# Patient Record
Sex: MALE | Race: WHITE | HISPANIC OR LATINO | Employment: UNEMPLOYED | URBAN - METROPOLITAN AREA
[De-identification: names, ages, dates, MRNs, and addresses within clinical notes are randomized per-mention and may not be internally consistent; named-entity substitution may affect disease eponyms.]

---

## 2020-01-28 ENCOUNTER — OFFICE VISIT (OUTPATIENT)
Dept: FAMILY MEDICINE CLINIC | Facility: CLINIC | Age: 30
End: 2020-01-28

## 2020-01-28 VITALS
HEART RATE: 77 BPM | DIASTOLIC BLOOD PRESSURE: 92 MMHG | RESPIRATION RATE: 18 BRPM | OXYGEN SATURATION: 100 % | TEMPERATURE: 99.2 F | SYSTOLIC BLOOD PRESSURE: 140 MMHG | BODY MASS INDEX: 29.61 KG/M2 | WEIGHT: 218.6 LBS | HEIGHT: 72 IN

## 2020-01-28 DIAGNOSIS — Z11.4 SCREENING FOR HIV (HUMAN IMMUNODEFICIENCY VIRUS): ICD-10-CM

## 2020-01-28 DIAGNOSIS — B19.20 HEPATITIS C VIRUS INFECTION WITHOUT HEPATIC COMA, UNSPECIFIED CHRONICITY: Primary | ICD-10-CM

## 2020-01-28 DIAGNOSIS — Z02.4 DRIVER'S PERMIT PE (PHYSICAL EXAMINATION): ICD-10-CM

## 2020-01-28 DIAGNOSIS — Z76.89 ENCOUNTER TO ESTABLISH CARE: ICD-10-CM

## 2020-01-28 PROCEDURE — 99203 OFFICE O/P NEW LOW 30 MIN: CPT | Performed by: FAMILY MEDICINE

## 2020-01-28 PROCEDURE — 3008F BODY MASS INDEX DOCD: CPT | Performed by: FAMILY MEDICINE

## 2020-01-28 NOTE — ASSESSMENT & PLAN NOTE
- two months ago patient donated plasma and was told he has hepatitis C during screening  - will check CMP, hepatitis chronic panel, Hepatitis C PCR quant and hepatitis C genotype, screen for HIV   - refer to Gastroenterology for treatment planning   - follow up after GI appointment

## 2020-01-28 NOTE — PROGRESS NOTES
Assessment/Plan     Hepatitis C virus infection without hepatic coma  - two months ago patient donated plasma and was told he has hepatitis C during screening  - will check CMP, hepatitis chronic panel, Hepatitis C PCR quant and hepatitis C genotype, screen for HIV   - refer to Gastroenterology for treatment planning   - follow up after GI appointment     's permit PE (physical examination)  -paper work for [employer] permit completed      Diagnoses and all orders for this visit:    Hepatitis C virus infection without hepatic coma, unspecified chronicity  -     Chronic Hepatitis Panel; Future  -     Hepatitis C RNA, quantitative, PCR; Future  -     Hepatitis C genotype; Future  -     Cancel: Ambulatory referral to Gastroenterology; Future  -     Comprehensive metabolic panel; Future  -     Ambulatory referral to Gastroenterology; Future    Screening for HIV (human immunodeficiency virus)  -     HIV 1/2 AG-AB combo; Future    Encounter to establish care    's permit PE (physical examination)         Subjective     Chief Complaint: Establish care, Hepatitis C     HPI:   This is a 33 yo M who presents to establish care and have 's permit physical  He reports he never had 's license  His previous permit   He donated plasma two months ago and was told he has heapatitis C  He would like treatment  He has been having daily fatigue, for the past month  The following portions of the patient's history were reviewed and updated as appropriate: allergies, current medications, past family history, past medical history, past social history, past surgical history and problem list     Review of Systems  Review of Systems   Constitutional: Positive for fatigue  Negative for chills and fever  HENT: Negative for congestion and sore throat  Respiratory: Negative for cough and shortness of breath  Cardiovascular: Negative for chest pain     Gastrointestinal: Negative for abdominal pain, constipation, diarrhea, nausea and vomiting  Genitourinary: Negative for difficulty urinating  Musculoskeletal: Negative for back pain  Skin: Negative for rash  Neurological: Negative for dizziness and headaches  Objective   Vitals:    01/28/20 1334   BP: 140/92   Pulse: 77   Resp: 18   Temp: 99 2 °F (37 3 °C)   SpO2: 100%       Physical Exam   Constitutional: He is oriented to person, place, and time  He appears well-developed and well-nourished  No distress  HENT:   Mouth/Throat: Oropharynx is clear and moist    Neck: Neck supple  Cardiovascular: Normal rate, regular rhythm and normal heart sounds  No murmur heard  Pulmonary/Chest: Effort normal and breath sounds normal  No respiratory distress  He has no wheezes  Abdominal: Soft  Bowel sounds are normal  There is no tenderness  Musculoskeletal: He exhibits no edema  Neurological: He is alert and oriented to person, place, and time  Skin: He is not diaphoretic     No jaundice appreciated

## 2020-01-31 ENCOUNTER — TRANSCRIBE ORDERS (OUTPATIENT)
Dept: LAB | Facility: CLINIC | Age: 30
End: 2020-01-31

## 2020-01-31 ENCOUNTER — APPOINTMENT (OUTPATIENT)
Dept: LAB | Facility: CLINIC | Age: 30
End: 2020-01-31
Payer: COMMERCIAL

## 2020-01-31 DIAGNOSIS — B19.20 HEPATITIS C VIRUS INFECTION WITHOUT HEPATIC COMA, UNSPECIFIED CHRONICITY: ICD-10-CM

## 2020-01-31 DIAGNOSIS — Z11.4 SCREENING FOR HIV (HUMAN IMMUNODEFICIENCY VIRUS): ICD-10-CM

## 2020-01-31 LAB
ALBUMIN SERPL BCP-MCNC: 4.2 G/DL (ref 3.5–5)
ALP SERPL-CCNC: 56 U/L (ref 46–116)
ALT SERPL W P-5'-P-CCNC: 74 U/L (ref 12–78)
ANION GAP SERPL CALCULATED.3IONS-SCNC: 0 MMOL/L (ref 4–13)
AST SERPL W P-5'-P-CCNC: 23 U/L (ref 5–45)
BILIRUB SERPL-MCNC: 1.03 MG/DL (ref 0.2–1)
BUN SERPL-MCNC: 16 MG/DL (ref 5–25)
CALCIUM SERPL-MCNC: 9.5 MG/DL (ref 8.3–10.1)
CHLORIDE SERPL-SCNC: 102 MMOL/L (ref 100–108)
CO2 SERPL-SCNC: 33 MMOL/L (ref 21–32)
CREAT SERPL-MCNC: 1.28 MG/DL (ref 0.6–1.3)
GFR SERPL CREATININE-BSD FRML MDRD: 75 ML/MIN/1.73SQ M
GLUCOSE P FAST SERPL-MCNC: 92 MG/DL (ref 65–99)
POTASSIUM SERPL-SCNC: 4.1 MMOL/L (ref 3.5–5.3)
PROT SERPL-MCNC: 8 G/DL (ref 6.4–8.2)
SODIUM SERPL-SCNC: 135 MMOL/L (ref 136–145)

## 2020-01-31 PROCEDURE — 80053 COMPREHEN METABOLIC PANEL: CPT

## 2020-01-31 PROCEDURE — 86704 HEP B CORE ANTIBODY TOTAL: CPT

## 2020-01-31 PROCEDURE — 87340 HEPATITIS B SURFACE AG IA: CPT

## 2020-01-31 PROCEDURE — 36415 COLL VENOUS BLD VENIPUNCTURE: CPT

## 2020-01-31 PROCEDURE — 87522 HEPATITIS C REVRS TRNSCRPJ: CPT

## 2020-01-31 PROCEDURE — 86803 HEPATITIS C AB TEST: CPT

## 2020-01-31 PROCEDURE — 87389 HIV-1 AG W/HIV-1&-2 AB AG IA: CPT

## 2020-01-31 PROCEDURE — 86705 HEP B CORE ANTIBODY IGM: CPT

## 2020-01-31 PROCEDURE — 87902 NFCT AGT GNTYP ALYS HEP C: CPT

## 2020-02-01 LAB
HBV CORE AB SER QL: ABNORMAL
HBV CORE IGM SER QL: ABNORMAL
HBV SURFACE AG SER QL: ABNORMAL
HCV AB SER QL: ABNORMAL

## 2020-02-03 LAB
HCV RNA SERPL NAA+PROBE-ACNC: NORMAL IU/ML
HCV RNA SERPL NAA+PROBE-LOG IU: 4.25 LOG10 IU/ML
TEST INFORMATION: NORMAL

## 2020-02-04 LAB
HCV GENTYP SERPL NAA+PROBE: NORMAL
HCV PLEASE NOTE: NORMAL
HIV 1+2 AB+HIV1 P24 AG SERPL QL IA: NORMAL

## 2020-02-11 ENCOUNTER — HOSPITAL ENCOUNTER (EMERGENCY)
Facility: HOSPITAL | Age: 30
Discharge: HOME | End: 2020-02-11
Attending: EMERGENCY MEDICINE | Admitting: EMERGENCY MEDICINE
Payer: COMMERCIAL

## 2020-02-11 VITALS
HEART RATE: 95 BPM | TEMPERATURE: 97.8 F | SYSTOLIC BLOOD PRESSURE: 139 MMHG | RESPIRATION RATE: 18 BRPM | DIASTOLIC BLOOD PRESSURE: 89 MMHG | OXYGEN SATURATION: 99 %

## 2020-02-11 DIAGNOSIS — R45.1 AGITATION: ICD-10-CM

## 2020-02-11 DIAGNOSIS — F13.10 BENZODIAZEPINE ABUSE (HCC): ICD-10-CM

## 2020-02-11 DIAGNOSIS — F10.920 ALCOHOLIC INTOXICATION WITHOUT COMPLICATION (HCC): Primary | ICD-10-CM

## 2020-02-11 LAB
ALBUMIN SERPL BCP-MCNC: 4.8 G/DL (ref 3.5–5)
ALP SERPL-CCNC: 62 U/L (ref 46–116)
ALT SERPL W P-5'-P-CCNC: 76 U/L (ref 12–78)
AMPHETAMINES SERPL QL SCN: NEGATIVE
ANION GAP SERPL CALCULATED.3IONS-SCNC: 8 MMOL/L (ref 4–13)
APAP SERPL-MCNC: <2 UG/ML (ref 10–20)
AST SERPL W P-5'-P-CCNC: 29 U/L (ref 5–45)
BARBITURATES UR QL: NEGATIVE
BASOPHILS # BLD AUTO: 0.04 THOUSANDS/ΜL (ref 0–0.1)
BASOPHILS NFR BLD AUTO: 1 % (ref 0–1)
BENZODIAZ UR QL: POSITIVE
BILIRUB SERPL-MCNC: 0.75 MG/DL (ref 0.2–1)
BILIRUB UR QL STRIP: NEGATIVE
BUN SERPL-MCNC: 12 MG/DL (ref 5–25)
CALCIUM SERPL-MCNC: 9.4 MG/DL (ref 8.3–10.1)
CHLORIDE SERPL-SCNC: 102 MMOL/L (ref 100–108)
CLARITY UR: CLEAR
CO2 SERPL-SCNC: 32 MMOL/L (ref 21–32)
COCAINE UR QL: NEGATIVE
COLOR UR: YELLOW
COLOR, POC: YELLOW
CREAT SERPL-MCNC: 1.09 MG/DL (ref 0.6–1.3)
EOSINOPHIL # BLD AUTO: 0.09 THOUSAND/ΜL (ref 0–0.61)
EOSINOPHIL NFR BLD AUTO: 1 % (ref 0–6)
ERYTHROCYTE [DISTWIDTH] IN BLOOD BY AUTOMATED COUNT: 12.2 % (ref 11.6–15.1)
ETHANOL EXG-MCNC: 0.08 MG/DL
ETHANOL SERPL-MCNC: 155 MG/DL (ref 0–3)
GFR SERPL CREATININE-BSD FRML MDRD: 91 ML/MIN/1.73SQ M
GLUCOSE SERPL-MCNC: 110 MG/DL (ref 65–140)
GLUCOSE UR STRIP-MCNC: NEGATIVE MG/DL
HCT VFR BLD AUTO: 50.7 % (ref 36.5–49.3)
HGB BLD-MCNC: 16.9 G/DL (ref 12–17)
HGB UR QL STRIP.AUTO: NEGATIVE
IMM GRANULOCYTES # BLD AUTO: 0.02 THOUSAND/UL (ref 0–0.2)
IMM GRANULOCYTES NFR BLD AUTO: 0 % (ref 0–2)
KETONES UR STRIP-MCNC: NEGATIVE MG/DL
LEUKOCYTE ESTERASE UR QL STRIP: NEGATIVE
LYMPHOCYTES # BLD AUTO: 1.99 THOUSANDS/ΜL (ref 0.6–4.47)
LYMPHOCYTES NFR BLD AUTO: 29 % (ref 14–44)
MAGNESIUM SERPL-MCNC: 2.2 MG/DL (ref 1.6–2.6)
MCH RBC QN AUTO: 32 PG (ref 26.8–34.3)
MCHC RBC AUTO-ENTMCNC: 33.3 G/DL (ref 31.4–37.4)
MCV RBC AUTO: 96 FL (ref 82–98)
METHADONE UR QL: NEGATIVE
MONOCYTES # BLD AUTO: 0.58 THOUSAND/ΜL (ref 0.17–1.22)
MONOCYTES NFR BLD AUTO: 9 % (ref 4–12)
NEUTROPHILS # BLD AUTO: 4.12 THOUSANDS/ΜL (ref 1.85–7.62)
NEUTS SEG NFR BLD AUTO: 60 % (ref 43–75)
NITRITE UR QL STRIP: NEGATIVE
NRBC BLD AUTO-RTO: 0 /100 WBCS
OPIATES UR QL SCN: NEGATIVE
PCP UR QL: NEGATIVE
PH UR STRIP.AUTO: 5.5 [PH] (ref 4.5–8)
PLATELET # BLD AUTO: 290 THOUSANDS/UL (ref 149–390)
PMV BLD AUTO: 9.7 FL (ref 8.9–12.7)
POTASSIUM SERPL-SCNC: 4.4 MMOL/L (ref 3.5–5.3)
PROT SERPL-MCNC: 8.7 G/DL (ref 6.4–8.2)
PROT UR STRIP-MCNC: NEGATIVE MG/DL
RBC # BLD AUTO: 5.28 MILLION/UL (ref 3.88–5.62)
SALICYLATES SERPL-MCNC: <3 MG/DL (ref 3–20)
SODIUM SERPL-SCNC: 142 MMOL/L (ref 136–145)
SP GR UR STRIP.AUTO: 1.01 (ref 1–1.03)
THC UR QL: NEGATIVE
UROBILINOGEN UR QL STRIP.AUTO: 0.2 E.U./DL
WBC # BLD AUTO: 6.84 THOUSAND/UL (ref 4.31–10.16)

## 2020-02-11 PROCEDURE — 82075 ASSAY OF BREATH ETHANOL: CPT | Performed by: NURSE PRACTITIONER

## 2020-02-11 PROCEDURE — 36415 COLL VENOUS BLD VENIPUNCTURE: CPT | Performed by: NURSE PRACTITIONER

## 2020-02-11 PROCEDURE — 93005 ELECTROCARDIOGRAM TRACING: CPT

## 2020-02-11 PROCEDURE — 80053 COMPREHEN METABOLIC PANEL: CPT | Performed by: NURSE PRACTITIONER

## 2020-02-11 PROCEDURE — 99284 EMERGENCY DEPT VISIT MOD MDM: CPT | Performed by: NURSE PRACTITIONER

## 2020-02-11 PROCEDURE — 83735 ASSAY OF MAGNESIUM: CPT | Performed by: NURSE PRACTITIONER

## 2020-02-11 PROCEDURE — 80320 DRUG SCREEN QUANTALCOHOLS: CPT | Performed by: NURSE PRACTITIONER

## 2020-02-11 PROCEDURE — 99285 EMERGENCY DEPT VISIT HI MDM: CPT

## 2020-02-11 PROCEDURE — 96360 HYDRATION IV INFUSION INIT: CPT

## 2020-02-11 PROCEDURE — 96361 HYDRATE IV INFUSION ADD-ON: CPT

## 2020-02-11 PROCEDURE — 85025 COMPLETE CBC W/AUTO DIFF WBC: CPT | Performed by: NURSE PRACTITIONER

## 2020-02-11 PROCEDURE — 80307 DRUG TEST PRSMV CHEM ANLYZR: CPT | Performed by: NURSE PRACTITIONER

## 2020-02-11 PROCEDURE — 80329 ANALGESICS NON-OPIOID 1 OR 2: CPT | Performed by: NURSE PRACTITIONER

## 2020-02-11 PROCEDURE — 81003 URINALYSIS AUTO W/O SCOPE: CPT

## 2020-02-11 RX ADMIN — SODIUM CHLORIDE 1000 ML: 0.9 INJECTION, SOLUTION INTRAVENOUS at 20:12

## 2020-02-12 LAB
ATRIAL RATE: 86 BPM
P AXIS: 58 DEGREES
PR INTERVAL: 150 MS
QRS AXIS: 70 DEGREES
QRSD INTERVAL: 82 MS
QT INTERVAL: 336 MS
QTC INTERVAL: 402 MS
T WAVE AXIS: 42 DEGREES
VENTRICULAR RATE: 86 BPM

## 2020-02-12 PROCEDURE — 93010 ELECTROCARDIOGRAM REPORT: CPT | Performed by: INTERNAL MEDICINE

## 2020-02-12 NOTE — ED PROVIDER NOTES
History  Chief Complaint   Patient presents with    Altered Mental Status     arrived via EMS, they report pt was at sponsor's house and feel asleep during dinner and was hard to arouse  Per EMS pt was slopw to respond when arrived, put was able to walk to Ambulance  pt denies any complaints on arrival  denies drug/alcohol use today  This is a 34year old male who was brought to the ED via EMS due to being at his sponsor's house and fell asleep during dinner and was difficult to arise  EMS states he was slow to respond when they arrived  Pt was able to walk to ambulance  Pt denies any drug or alcohol use today  RN states that pt was to sleep at shelter tonight  Pt states he does not know why he is in the ED and has no idea how he got here  He states that he has a history of "doing everything man"  Pt states he has no residence  History provided by:  Medical records and patient   used: No    Altered Mental Status   Presenting symptoms: unresponsiveness    Severity:  Unable to specify  Episode history:  Unable to specify  Timing:  Unable to specify  Progression:  Unable to specify  Context: alcohol use and drug use    Associated symptoms: agitation        None       Past Medical History:   Diagnosis Date    Hepatitis C        History reviewed  No pertinent surgical history  Family History   Problem Relation Age of Onset    Hepatitis Mother     HIV Mother     Heart attack Father      I have reviewed and agree with the history as documented      Social History     Tobacco Use    Smoking status: Current Every Day Smoker     Packs/day: 0 25     Years: 15 00     Pack years: 3 75     Types: Cigarettes    Smokeless tobacco: Never Used   Substance Use Topics    Alcohol use: Yes     Frequency: Monthly or less     Drinks per session: 1 or 2     Binge frequency: Never     Comment: "weekends"    Drug use: No       Review of Systems   Unable to perform ROS: Other (pt agitated and uncooperative with assessment )   Psychiatric/Behavioral: Positive for agitation  Physical Exam  Physical Exam   Constitutional: He appears well-developed and well-nourished  Non-toxic appearance  He does not appear ill  No distress  Pt is sleeping on the exam bed and does not respond to verbal stimuli  Tactile stimuli used to get pt to respond  Pt is agitated and verbally abusive  HENT:   Head: Normocephalic and atraumatic  Mouth/Throat: Oropharynx is clear and moist    Eyes: EOM are normal  Right eye exhibits no nystagmus  Left eye exhibits no nystagmus  B/L pupils 4 mm and sluggish   Eyes are glassy appearing    Neck: Normal range of motion  Neck supple  Cardiovascular: Normal rate, regular rhythm and normal heart sounds  Pulmonary/Chest: Effort normal and breath sounds normal    Abdominal: Soft  Bowel sounds are normal    Musculoskeletal: Normal range of motion  Neurological: He is alert  He has normal strength  He displays normal reflexes  No cranial nerve deficit or sensory deficit  He displays a negative Romberg sign  Coordination and gait normal  GCS eye subscore is 4  GCS verbal subscore is 5  GCS motor subscore is 6  Skin: Skin is warm and dry  Capillary refill takes less than 2 seconds  Psychiatric: He is agitated  Passive aggressive, verbally abusive  Swearing consistently through interview/assessment/exam     Nursing note and vitals reviewed        Vital Signs  ED Triage Vitals   Temperature Pulse Respirations Blood Pressure SpO2   02/11/20 1857 02/11/20 1846 02/11/20 1846 02/11/20 1846 02/11/20 1846   97 8 °F (36 6 °C) 99 16 141/81 99 %      Temp Source Heart Rate Source Patient Position - Orthostatic VS BP Location FiO2 (%)   02/11/20 1857 02/11/20 1846 02/11/20 2221 02/11/20 2221 --   Oral Monitor Sitting Right arm       Pain Score       02/11/20 1846       No Pain           Vitals:    02/11/20 1846 02/11/20 2221   BP: 141/81 139/89   Pulse: 99 95   Patient Position - Orthostatic VS:  Sitting         Visual Acuity      ED Medications  Medications   sodium chloride 0 9 % bolus 1,000 mL (0 mL Intravenous Stopped 2/11/20 2252)       Diagnostic Studies  Results Reviewed     Procedure Component Value Units Date/Time    POCT alcohol breath test [[id number]]  (Normal) Resulted:  02/11/20 2250    Lab Status:  Final result Updated:  02/11/20 2250     EXTBreath Alcohol 6 028    Salicylate level [[id number]]  (Abnormal) Collected:  02/11/20 2001    Lab Status:  Final result Specimen:  Blood from Arm, Left Updated:  68/12/14 0662     Salicylate Lvl <3 mg/dL     Acetaminophen level-If concentration is detectable, please discuss with medical  on call  [[id number]]  (Abnormal) Collected:  02/11/20 2001    Lab Status:  Final result Specimen:  Blood from Arm, Left Updated:  02/11/20 2056     Acetaminophen Level <2 ug/mL     Ethanol [[id number]]  (Abnormal) Collected:  02/11/20 2001    Lab Status:  Final result Specimen:  Blood from Arm, Left Updated:  02/11/20 2045     Ethanol Lvl 155 mg/dL     Rapid drug screen, urine [[id number]]  (Abnormal) Collected:  02/11/20 2010    Lab Status:  Final result Specimen:  Urine, Other Updated:  02/11/20 2037     Amph/Meth UR Negative     Barbiturate Ur Negative     Benzodiazepine Urine Positive     Cocaine Urine Negative     Methadone Urine Negative     Opiate Urine Negative     PCP Ur Negative     THC Urine Negative    Narrative:       Presumptive report  If requested, specimen will be sent to reference lab for confirmation  FOR MEDICAL PURPOSES ONLY  IF CONFIRMATION NEEDED PLEASE CONTACT THE LAB WITHIN 5 DAYS      Drug Screen Cutoff Levels:  AMPHETAMINE/METHAMPHETAMINES  1000 ng/mL  BARBITURATES     200 ng/mL  BENZODIAZEPINES     200 ng/mL  COCAINE      300 ng/mL  METHADONE      300 ng/mL  OPIATES      300 ng/mL  PHENCYCLIDINE     25 ng/mL  THC       50 ng/mL      Comprehensive metabolic panel [[id number]]  (Abnormal) Collected:  02/11/20 2001    Lab Status:  Final result Specimen:  Blood from Arm, Left Updated:  02/11/20 2034     Sodium 142 mmol/L      Potassium 4 4 mmol/L      Chloride 102 mmol/L      CO2 32 mmol/L      ANION GAP 8 mmol/L      BUN 12 mg/dL      Creatinine 1 09 mg/dL      Glucose 110 mg/dL      Calcium 9 4 mg/dL      AST 29 U/L      ALT 76 U/L      Alkaline Phosphatase 62 U/L      Total Protein 8 7 g/dL      Albumin 4 8 g/dL      Total Bilirubin 0 75 mg/dL      eGFR 91 ml/min/1 73sq m     Narrative:       [place] guidelines for Chronic Kidney Disease (CKD):     Stage 1 with normal or high GFR (GFR > 90 mL/min/1 73 square meters)    Stage 2 Mild CKD (GFR = 60-89 mL/min/1 73 square meters)    Stage 3A Moderate CKD (GFR = 45-59 mL/min/1 73 square meters)    Stage 3B Moderate CKD (GFR = 30-44 mL/min/1 73 square meters)    Stage 4 Severe CKD (GFR = 15-29 mL/min/1 73 square meters)    Stage 5 End Stage CKD (GFR <15 mL/min/1 73 square meters)  Note: GFR calculation is accurate only with a steady state creatinine    Magnesium [[id number]]  (Normal) Collected:  02/11/20 2001    Lab Status:  Final result Specimen:  Blood from Arm, Left Updated:  02/11/20 2034     Magnesium 2 2 mg/dL     POCT urinalysis dipstick [[id number]]  (Normal) Resulted:  02/11/20 2008    Lab Status:  Final result Specimen:  Urine Updated:  02/11/20 2008     Color, UA yellow    Urine Macroscopic, POC [[id number]] Collected:  02/11/20 2007    Lab Status:  Final result Specimen:  Urine Updated:  02/11/20 2008     Color, UA Yellow     Clarity, UA Clear     pH, UA 5 5     Leukocytes, UA Negative     Nitrite, UA Negative     Protein, UA Negative mg/dl      Glucose, UA Negative mg/dl      Ketones, UA Negative mg/dl      Urobilinogen, UA 0 2 E U /dl      Bilirubin, UA Negative     Blood, UA Negative     Specific Gravity, UA 1 010    Narrative:       CLINITEK RESULT    CBC and differential [[id number]]  (Abnormal) Collected:  02/11/20 2001    Lab Status:  Final result Specimen:  Blood from Arm, Left Updated:  02/11/20 2007     WBC 6 84 Thousand/uL      RBC 5 28 Million/uL      Hemoglobin 16 9 g/dL      Hematocrit 50 7 %      MCV 96 fL      MCH 32 0 pg      MCHC 33 3 g/dL      RDW 12 2 %      MPV 9 7 fL      Platelets 187 Thousands/uL      nRBC 0 /100 WBCs      Neutrophils Relative 60 %      Immat GRANS % 0 %      Lymphocytes Relative 29 %      Monocytes Relative 9 %      Eosinophils Relative 1 %      Basophils Relative 1 %      Neutrophils Absolute 4 12 Thousands/µL      Immature Grans Absolute 0 02 Thousand/uL      Lymphocytes Absolute 1 99 Thousands/µL      Monocytes Absolute 0 58 Thousand/µL      Eosinophils Absolute 0 09 Thousand/µL      Basophils Absolute 0 04 Thousands/µL                  No orders to display              Procedures  ECG 12 Lead Documentation Only  Date/Time: 2/11/2020 8:02 PM  Performed by: NINA Barcenas  Authorized by: NINA Barcenas     Indications / Diagnosis:  Unresponsive episode   ECG reviewed by me, the ED Provider: yes (Dr Gracie Hoffmann )    Patient location:  ED  Previous ECG:     Previous ECG:  Unavailable    Comparison to cardiac monitor: Yes    Interpretation:     Interpretation: normal    Rate:     ECG rate:  86    ECG rate assessment: normal    Rhythm:     Rhythm: sinus rhythm    Ectopy:     Ectopy: none    QRS:     QRS axis:  Normal    QRS intervals:  Normal  Conduction:     Conduction: normal    ST segments:     ST segments:  Normal  T waves:     T waves: normal               ED Course  ED Course as of Feb 11 2336   Tue Feb 11, 2020 2048 Pt is heard speaking on the phone  He is awake and talking in sentences and quite alert and awake  ETOH 155   + benzo's  All labs reviewed and discussed with pt         2101 Sponsor is currently here with pt and is refusing to take him home  Pt states he will have to go to a shelter for the night    I have informed pt that he is not able to be discharged at this time due to elevated ETOH level and will recheck at 11pm and if legal limit he may be d/c  Pt is angry and argumentative regarding this action but I have explained in great detail the legalities of this  Pt admits to ETOH and benzo's  2110 Pt was offered HOST and he refused  He denies SI         2111 Pt symptoms compatible with ETOH and benzo use  Pt is awake and neuro intact  I do not feel pt warrants CT head at this time  2215 Pt is sitting on the edge of the bed  eating and in no distress  2250 POCT ETOH  078 pt stable for discharge  Pt is A&O x 3, he has been on cell phone several times through evening  He is ambulatory and steady on his feet  2253 Pt walked out of ED after IV being removed  He left w/o receiving dc instructions             /89 (BP Location: Right arm)   Pulse 95   Temp 97 8 °F (36 6 °C) (Oral)   Resp 18   SpO2 99%                             MDM  Number of Diagnoses or Management Options  Diagnosis management comments: Unresponsive episode (as per EMS)  Hx of Drug and Alcohol abuse  Homelessness    Plan  Labs  IV  IVF  ua  uds  EKG  Tele  May need CT head   Reassess        Amount and/or Complexity of Data Reviewed  Clinical lab tests: ordered and reviewed  Review and summarize past medical records: yes          Disposition  Final diagnoses:   Alcoholic intoxication without complication ([facility] )   Benzodiazepine abuse ([facility] )   Agitation     Time reflects when diagnosis was documented in both MDM as applicable and the Disposition within this note     Time User Action Codes Description Comment    2/11/2020 10:51 PM [employer] Add [E48 418] Alcoholic intoxication without complication ([facility] )     1/73/0054 10:52 PM [employer] Add [F13 10] Benzodiazepine abuse ([address] )     2/11/2020 10:52 PM [employer] Add [R45 1] Agitation       ED Disposition     ED Disposition Condition Date/Time Comment    Discharge Stable Tue Feb 11, 2020 10:53 PM Jorge L Petties III discharge to home/self care   Pt walked out of ED after IV removed  He did not wait for discharge instructions  Follow-up Information    None         There are no discharge medications for this patient  No discharge procedures on file      PDMP Review     None          ED Provider  Electronically Signed by           Kae Cruz  02/11/20 8687

## 2020-03-23 ENCOUNTER — TELEPHONE (OUTPATIENT)
Dept: GASTROENTEROLOGY | Facility: AMBULARY SURGERY CENTER | Age: 30
End: 2020-03-23

## 2020-03-24 ENCOUNTER — HOSPITAL ENCOUNTER (EMERGENCY)
Facility: HOSPITAL | Age: 30
Discharge: HOME | End: 2020-03-24
Attending: EMERGENCY MEDICINE | Admitting: EMERGENCY MEDICINE
Payer: COMMERCIAL

## 2020-03-24 VITALS
SYSTOLIC BLOOD PRESSURE: 134 MMHG | HEART RATE: 81 BPM | DIASTOLIC BLOOD PRESSURE: 76 MMHG | BODY MASS INDEX: 29.16 KG/M2 | TEMPERATURE: 99.3 F | RESPIRATION RATE: 16 BRPM | WEIGHT: 215 LBS | OXYGEN SATURATION: 96 %

## 2020-03-24 DIAGNOSIS — T40.1X1A HEROIN OVERDOSE (HCC): Primary | ICD-10-CM

## 2020-03-24 PROCEDURE — 99284 EMERGENCY DEPT VISIT MOD MDM: CPT

## 2020-03-24 PROCEDURE — 99282 EMERGENCY DEPT VISIT SF MDM: CPT | Performed by: PHYSICIAN ASSISTANT

## 2020-03-25 NOTE — ED NOTES
Pt drinking water, tolerating well  Pt denies any complaints at this time       Vanessa Ingram, HERMINIO  03/24/20 8677

## 2020-03-25 NOTE — ED PROVIDER NOTES
History  Chief Complaint   Patient presents with    Overdose - Accidental     Pt reports snorting one bag of heroin today  EMS gave 0 5 mg of Narcan nasaly  Pt is alert and oriented x 4 in his room  33 yo M BIBEMS after accidental overdose  Pt reports snorting 1 bag of heroin today with 2 friends  Last used was approximately 5 months ago  No other drug use  No alcohol use today  He was given 1 mg IN narcan by EMS prior to arrival and is aaox4  Pt ambulating from ambulance bay to exam room  No complaints at this time  None       Past Medical History:   Diagnosis Date    Hepatitis C        History reviewed  No pertinent surgical history  Family History   Problem Relation Age of Onset    Hepatitis Mother     HIV Mother     Heart attack Father      I have reviewed and agree with the history as documented  E-Cigarette/Vaping     E-Cigarette/Vaping Substances     Social History     Tobacco Use    Smoking status: Current Every Day Smoker     Packs/day: 0 25     Years: 15 00     Pack years: 3 75     Types: Cigarettes    Smokeless tobacco: Never Used   Substance Use Topics    Alcohol use: Yes     Frequency: Monthly or less     Drinks per session: 1 or 2     Binge frequency: Never     Comment: "weekends"    Drug use: Yes     Types: Heroin       Review of Systems   All other systems reviewed and are negative  Physical Exam  Physical Exam   Constitutional: He is oriented to person, place, and time  He appears well-developed and well-nourished  No distress  Asking repetitively for water   HENT:   Head: Normocephalic and atraumatic  Right Ear: External ear normal    Left Ear: External ear normal    Mouth/Throat: No oropharyngeal exudate  Eyes: Conjunctivae are normal    Pin point pupils, EOM grossly intact   Neck: Normal range of motion  Neck supple  No JVD present  Cardiovascular: Normal rate  Pulmonary/Chest: Effort normal  No stridor  No respiratory distress  He has no wheezes  Abdominal: Soft  He exhibits no distension  There is no tenderness  Musculoskeletal:   FROM, steady gait, cap refill brisk, strength and sensation grossly intact throughout   Neurological: He is alert and oriented to person, place, and time  Skin: Skin is warm and dry  Capillary refill takes less than 2 seconds  He is not diaphoretic  Psychiatric: He has a normal mood and affect  His behavior is normal    Nursing note and vitals reviewed  Vital Signs  ED Triage Vitals [03/24/20 2101]   Temperature Pulse Respirations Blood Pressure SpO2   99 3 °F (37 4 °C) (!) 118 18 145/89 95 %      Temp Source Heart Rate Source Patient Position - Orthostatic VS BP Location FiO2 (%)   Tympanic Monitor Sitting Left arm --      Pain Score       --           Vitals:    03/24/20 2101 03/24/20 2213   BP: 145/89    Pulse: (!) 118 86   Patient Position - Orthostatic VS: Sitting          Visual Acuity      ED Medications  Medications - No data to display    Diagnostic Studies  Results Reviewed     None                 No orders to display              Procedures  Procedures         ED Course  ED Course as of Mar 24 2213   Tue Mar 24, 2020   2209 Pt HR improved, sat >95% during the hour he has been observed, he is awake and oriented, will d/c home                                    MDM  Number of Diagnoses or Management Options  Heroin overdose [facility]):   Diagnosis management comments: 35 yo M presenting for evaluation after accidental overdose, pt was given 1 mg narcan and became responsive, pt ambulating from ambulance to the exam room without difficulty, he was awake here, HR improved and pt O2 sats were >95%, will d/c pt home, f/u with pcp as an outpatient    strict return to ED precautions discussed  Pt verbalizes understanding and agrees with plan  Pt is stable for discharge    Portions of the record may have been created with voice recognition software   Occasional wrong word or "sound a like" substitutions may have occurred due to the inherent limitations of voice recognition software  Read the chart carefully and recognize, using context, where substitutions have occurred  Disposition  Final diagnoses:   Heroin overdose ([address] )     Time reflects when diagnosis was documented in both MDM as applicable and the Disposition within this note     Time User Action Codes Description Comment    3/24/2020 10:10 PM [place] Heroin overdose [facility])       ED Disposition     ED Disposition Condition Date/Time Comment    Discharge Stable Tue Mar 24, 2020 10:10 PM Zhou Pierre III discharge to home/self care  Follow-up Information     Follow up With Specialties Details Why [address], DO Family Medicine Call in 1 day  [address] [facility] [place]  [phone]            Patient's Medications    No medications on file     No discharge procedures on file      PDMP Review     None          ED Provider  Electronically Signed by           Rebekah Fernandez PA-C  03/24/20 7792

## 2020-04-14 ENCOUNTER — TELEMEDICINE (OUTPATIENT)
Dept: GASTROENTEROLOGY | Facility: AMBULARY SURGERY CENTER | Age: 30
End: 2020-04-14

## 2020-04-14 DIAGNOSIS — B19.20 HEPATITIS C VIRUS INFECTION WITHOUT HEPATIC COMA, UNSPECIFIED CHRONICITY: Primary | ICD-10-CM

## 2020-04-14 PROCEDURE — G2012 BRIEF CHECK IN BY MD/QHP: HCPCS | Performed by: INTERNAL MEDICINE

## 2020-04-25 ENCOUNTER — APPOINTMENT (OUTPATIENT)
Dept: LAB | Facility: HOSPITAL | Age: 30
End: 2020-04-25
Attending: INTERNAL MEDICINE
Payer: COMMERCIAL

## 2020-04-25 DIAGNOSIS — B19.20 HEPATITIS C VIRUS INFECTION WITHOUT HEPATIC COMA, UNSPECIFIED CHRONICITY: ICD-10-CM

## 2020-04-25 LAB
ETHANOL SERPL-MCNC: 4 MG/DL (ref 0–3)
FERRITIN SERPL-MCNC: 106 NG/ML (ref 8–388)
IRON SATN MFR SERPL: 56 %
IRON SERPL-MCNC: 136 UG/DL (ref 65–175)
TIBC SERPL-MCNC: 241 UG/DL (ref 250–450)

## 2020-04-25 PROCEDURE — 83540 ASSAY OF IRON: CPT

## 2020-04-25 PROCEDURE — 83550 IRON BINDING TEST: CPT

## 2020-04-25 PROCEDURE — 36415 COLL VENOUS BLD VENIPUNCTURE: CPT

## 2020-04-25 PROCEDURE — 80320 DRUG SCREEN QUANTALCOHOLS: CPT

## 2020-04-25 PROCEDURE — 82977 ASSAY OF GGT: CPT

## 2020-04-25 PROCEDURE — 83010 ASSAY OF HAPTOGLOBIN QUANT: CPT

## 2020-04-25 PROCEDURE — 82247 BILIRUBIN TOTAL: CPT

## 2020-04-25 PROCEDURE — 82728 ASSAY OF FERRITIN: CPT

## 2020-04-25 PROCEDURE — 83883 ASSAY NEPHELOMETRY NOT SPEC: CPT

## 2020-04-25 PROCEDURE — 82172 ASSAY OF APOLIPOPROTEIN: CPT

## 2020-04-25 PROCEDURE — 80307 DRUG TEST PRSMV CHEM ANLYZR: CPT | Performed by: INTERNAL MEDICINE

## 2020-04-25 PROCEDURE — 84460 ALANINE AMINO (ALT) (SGPT): CPT

## 2020-04-28 LAB
A2 MACROGLOB SERPL-MCNC: 144 MG/DL (ref 110–276)
ALT SERPL W P-5'-P-CCNC: 18 IU/L (ref 0–55)
AMPHETAMINES UR QL SCN: NEGATIVE NG/ML
APO A-I SERPL-MCNC: 96 MG/DL (ref 101–178)
BARBITURATES UR QL SCN: NEGATIVE NG/ML
BENZODIAZ UR QL: NEGATIVE NG/ML
BILIRUB SERPL-MCNC: 0.5 MG/DL (ref 0–1.2)
BZE UR QL: NEGATIVE NG/ML
CANNABINOIDS UR QL SCN: POSITIVE
COMMENT: ABNORMAL
FIBROSIS SCORING:: ABNORMAL
FIBROSIS STAGE SERPL QL: ABNORMAL
GGT SERPL-CCNC: 23 IU/L (ref 0–65)
HAPTOGLOB SERPL-MCNC: 244 MG/DL (ref 17–317)
INTERPRETATIONS: ABNORMAL
LIVER FIBR SCORE SERPL CALC.FIBROSURE: 0.09 (ref 0–0.21)
METHADONE UR QL SCN: NEGATIVE NG/ML
NECROINFLAMM ACTIVITY SCORING:: ABNORMAL
NECROINFLAMMATORY ACT GRADE SERPL QL: ABNORMAL
NECROINFLAMMATORY ACT SCORE SERPL: 0.05 (ref 0–0.17)
OPIATES UR QL: NEGATIVE NG/ML
PCP UR QL: NEGATIVE NG/ML
PROPOXYPH UR QL SCN: NEGATIVE NG/ML
SERVICE CMNT-IMP: ABNORMAL

## 2020-05-21 DIAGNOSIS — B19.20 HEPATITIS C VIRUS INFECTION WITHOUT HEPATIC COMA, UNSPECIFIED CHRONICITY: Primary | ICD-10-CM

## 2020-05-27 ENCOUNTER — APPOINTMENT (OUTPATIENT)
Dept: LAB | Facility: HOSPITAL | Age: 30
End: 2020-05-27
Attending: INTERNAL MEDICINE
Payer: COMMERCIAL

## 2020-05-27 DIAGNOSIS — B19.20 HEPATITIS C VIRUS INFECTION WITHOUT HEPATIC COMA, UNSPECIFIED CHRONICITY: ICD-10-CM

## 2020-05-27 LAB
HAV AB SER QL IA: NORMAL
HBV CORE IGM SER QL: NORMAL
HBV SURFACE AB SER-ACNC: 412.83 MIU/ML
INR PPP: 1.02 (ref 0.84–1.19)
PROTHROMBIN TIME: 13.5 SECONDS (ref 11.6–14.5)

## 2020-05-27 PROCEDURE — 86708 HEPATITIS A ANTIBODY: CPT

## 2020-05-27 PROCEDURE — 36415 COLL VENOUS BLD VENIPUNCTURE: CPT

## 2020-05-27 PROCEDURE — 85610 PROTHROMBIN TIME: CPT

## 2020-05-27 PROCEDURE — 86706 HEP B SURFACE ANTIBODY: CPT

## 2020-05-27 PROCEDURE — 86705 HEP B CORE ANTIBODY IGM: CPT

## 2020-05-28 ENCOUNTER — TELEPHONE (OUTPATIENT)
Dept: GASTROENTEROLOGY | Facility: CLINIC | Age: 30
End: 2020-05-28

## 2020-06-03 ENCOUNTER — TELEPHONE (OUTPATIENT)
Dept: GASTROENTEROLOGY | Facility: CLINIC | Age: 30
End: 2020-06-03

## 2020-06-03 DIAGNOSIS — B19.20 HEPATITIS C VIRUS INFECTION WITHOUT HEPATIC COMA, UNSPECIFIED CHRONICITY: Primary | ICD-10-CM

## 2020-07-01 ENCOUNTER — HOSPITAL ENCOUNTER (EMERGENCY)
Facility: HOSPITAL | Age: 30
Discharge: LEFT BEFORE DISCHARGE | End: 2020-07-01
Attending: EMERGENCY MEDICINE
Payer: COMMERCIAL

## 2020-07-01 VITALS
WEIGHT: 218.44 LBS | RESPIRATION RATE: 18 BRPM | OXYGEN SATURATION: 93 % | DIASTOLIC BLOOD PRESSURE: 81 MMHG | HEART RATE: 64 BPM | SYSTOLIC BLOOD PRESSURE: 164 MMHG | BODY MASS INDEX: 29.63 KG/M2 | TEMPERATURE: 98.7 F

## 2020-07-01 DIAGNOSIS — F11.10 HEROIN ABUSE (HCC): ICD-10-CM

## 2020-07-01 DIAGNOSIS — F14.10 COCAINE ABUSE (HCC): Primary | ICD-10-CM

## 2020-07-01 LAB
AMPHETAMINES SERPL QL SCN: NEGATIVE
BARBITURATES UR QL: NEGATIVE
BENZODIAZ UR QL: NEGATIVE
COCAINE UR QL: POSITIVE
ETHANOL EXG-MCNC: 0 MG/DL
GLUCOSE SERPL-MCNC: 109 MG/DL (ref 65–140)
METHADONE UR QL: NEGATIVE
OPIATES UR QL SCN: POSITIVE
OXYCODONE+OXYMORPHONE UR QL SCN: NEGATIVE
PCP UR QL: NEGATIVE
THC UR QL: NEGATIVE

## 2020-07-01 PROCEDURE — 80307 DRUG TEST PRSMV CHEM ANLYZR: CPT | Performed by: EMERGENCY MEDICINE

## 2020-07-01 PROCEDURE — 82075 ASSAY OF BREATH ETHANOL: CPT | Performed by: EMERGENCY MEDICINE

## 2020-07-01 PROCEDURE — 99284 EMERGENCY DEPT VISIT MOD MDM: CPT | Performed by: EMERGENCY MEDICINE

## 2020-07-01 PROCEDURE — 82948 REAGENT STRIP/BLOOD GLUCOSE: CPT

## 2020-07-01 PROCEDURE — 99284 EMERGENCY DEPT VISIT MOD MDM: CPT

## 2020-07-01 RX ORDER — ONDANSETRON 4 MG/1
4 TABLET, ORALLY DISINTEGRATING ORAL ONCE
Status: COMPLETED | OUTPATIENT
Start: 2020-07-01 | End: 2020-07-01

## 2020-07-01 RX ORDER — NALOXONE HYDROCHLORIDE 1 MG/ML
2 INJECTION INTRAMUSCULAR; INTRAVENOUS; SUBCUTANEOUS ONCE
Status: COMPLETED | OUTPATIENT
Start: 2020-07-01 | End: 2020-07-01

## 2020-07-01 RX ADMIN — NALOXONE HYDROCHLORIDE 2 MG: 1 INJECTION PARENTERAL at 21:41

## 2020-07-01 RX ADMIN — ONDANSETRON 4 MG: 4 TABLET, ORALLY DISINTEGRATING ORAL at 20:31

## 2020-07-02 NOTE — ED PROVIDER NOTES
History  Chief Complaint   Patient presents with    Addiction Problem     Pt reports recently being at [facility] for herroin detox and recovery  Pt reports being out into a rehab house and relapsing appr  5 days ago  Pt states, "I smoked crack and having shot up heroin"  Pt last used heroin within the past 1 hr  Patient is a 19-year-old male with a history of hepatitis-C coming in today after he relapsed  Patient states he was discharged from [address] [place] [address] for heroin detox and recovery  Reports that he did not have enough money to get into a facility  He tells me he started relapsing on IV heroin 3 or 4 days ago  He has been using 2 or 3 bags of heroin IV daily  He states this is his base of what he typically uses  He does not do this to hurt himself but uses at as recreation to get high  He denies any other substance abuse or alcohol abuse  He last use approximately 1 hour ago  History provided by:  Patient  Addiction Problem   Similar prior episodes: yes    Severity:  Moderate  Onset quality:  Unable to specify  Timing:  Constant  Progression:  Unchanged  Chronicity:  Recurrent  Suspected agents:  Heroin  Associated symptoms: no abdominal pain, no agitation, no blackouts, no bladder incontinence, no bowel incontinence, no confusion, no hallucinations, no headaches, no loss of consciousness, no nausea, no palpitations, no seizures, no shortness of breath, no somnolence, no suicidal ideation, no violence, no vomiting and no weakness    Risk factors: addiction treatment and chronic illness    Risk factors: no mental illness, no psychiatric hx, no recent illness, no recent infection and no withdrawal syndrome        Prior to Admission Medications   Prescriptions Last Dose Informant Patient Reported? Taking?    Glecaprevir-Pibrentasvir (Mavyret) 100-40 MG TABS   No No   Sig: Take 3 tablets by mouth daily      Facility-Administered Medications: None       Past Medical History: Diagnosis Date    Hepatitis C        History reviewed  No pertinent surgical history  Family History   Problem Relation Age of Onset    Hepatitis Mother     HIV Mother     Heart attack Father      I have reviewed and agree with the history as documented  E-Cigarette/Vaping     E-Cigarette/Vaping Substances     Social History     Tobacco Use    Smoking status: Current Every Day Smoker     Packs/day: 0 25     Years: 15 00     Pack years: 3 75     Types: Cigarettes    Smokeless tobacco: Never Used   Substance Use Topics    Alcohol use: Yes     Frequency: Monthly or less     Drinks per session: 1 or 2     Binge frequency: Never     Comment: "weekends"    Drug use: Yes     Types: Heroin, "Crack" cocaine       Review of Systems   Constitutional: Negative  Negative for diaphoresis and fever  HENT: Negative  Negative for ear pain and sore throat  Eyes: Negative  Negative for visual disturbance  Respiratory: Negative  Negative for chest tightness and shortness of breath  Cardiovascular: Negative  Negative for chest pain and palpitations  Gastrointestinal: Negative  Negative for abdominal pain, bowel incontinence, nausea and vomiting  Genitourinary: Negative  Negative for bladder incontinence, difficulty urinating and dysuria  Musculoskeletal: Negative  Negative for back pain and neck pain  Skin: Negative for rash  Neurological: Negative  Negative for seizures, loss of consciousness, weakness and headaches  Hematological: Negative  Psychiatric/Behavioral: Negative  Negative for agitation, confusion, hallucinations and suicidal ideas  All other systems reviewed and are negative  Physical Exam  Physical Exam   Constitutional: He is oriented to person, place, and time  He appears well-developed and well-nourished  No distress  HENT:   Head: Normocephalic and atraumatic  Dry mucous membranes  Patient maintaining airway and maintaining secretions    No brawniness under the tongue and no stridor   Eyes: Pupils are equal, round, and reactive to light  Conjunctivae and EOM are normal    Neck: Normal range of motion  Neck supple  Cardiovascular: Regular rhythm, normal heart sounds and intact distal pulses  Tachycardia present  No murmur heard  Pulses:       Radial pulses are 2+ on the right side, and 2+ on the left side  Dorsalis pedis pulses are 2+ on the right side, and 2+ on the left side  Pulmonary/Chest: Effort normal and breath sounds normal  No stridor  No respiratory distress  No conversational dyspnea   Abdominal: Soft  Bowel sounds are normal  He exhibits no distension  There is no tenderness  Musculoskeletal: Normal range of motion  He exhibits no edema  Neurological: He is alert and oriented to person, place, and time  No cranial nerve deficit  GCS eye subscore is 4  GCS verbal subscore is 5  GCS motor subscore is 6  No slurred speech  No facial asymmetry  No ataxia   Skin: Skin is warm  Capillary refill takes less than 2 seconds  He is diaphoretic  Nursing note and vitals reviewed        Vital Signs  ED Triage Vitals [07/01/20 2013]   Temperature Pulse Respirations Blood Pressure SpO2   98 8 °F (37 1 °C) 104 18 130/82 94 %      Temp Source Heart Rate Source Patient Position - Orthostatic VS BP Location FiO2 (%)   Tympanic Monitor Sitting Left arm --      Pain Score       --           Vitals:    07/01/20 2013 07/01/20 2059 07/01/20 2134 07/01/20 2146   BP: 130/82   164/81   Pulse: 104 98 78 64   Patient Position - Orthostatic VS: Sitting   Lying         Visual Acuity      ED Medications  Medications   ondansetron (ZOFRAN-ODT) dispersible tablet 4 mg (4 mg Oral Given 7/1/20 2031)   naloxone [facility]) intranasal 2 mg (2 mg Nasal Given 7/1/20 2141)       Diagnostic Studies  Results Reviewed     Procedure Component Value Units Date/Time    Rapid drug screen, urine [[id number]]  (Abnormal) Collected:  07/01/20 2210    Lab Status:  Final result Specimen: Urine, Clean Catch Updated:  07/01/20 2241     Amph/Meth UR Negative     Barbiturate Ur Negative     Benzodiazepine Urine Negative     Cocaine Urine Positive     Methadone Urine Negative     Opiate Urine Positive     PCP Ur Negative     THC Urine Negative     Oxycodone Urine Negative    Narrative:       Presumptive report  If requested, specimen will be sent to reference lab for confirmation  FOR MEDICAL PURPOSES ONLY  IF CONFIRMATION NEEDED PLEASE CONTACT THE LAB WITHIN 5 DAYS  Drug Screen Cutoff Levels:  AMPHETAMINE/METHAMPHETAMINES  1000 ng/mL  BARBITURATES     200 ng/mL  BENZODIAZEPINES     200 ng/mL  COCAINE      300 ng/mL  METHADONE      300 ng/mL  OPIATES      300 ng/mL  PHENCYCLIDINE     25 ng/mL  THC       50 ng/mL  OXYCODONE      100 ng/mL    POCT alcohol breath test [[id number]]  (Normal) Resulted:  07/01/20 2028    Lab Status:  Final result Updated:  07/01/20 2028     EXTBreath Alcohol 0 00    Fingerstick Glucose (POCT) [[id number]]  (Normal) Collected:  07/01/20 2025    Lab Status:  Final result Updated:  07/01/20 2026     POC Glucose 109 mg/dl                  No orders to display              Procedures  Procedures         ED Course  ED Course as of Jul 01 2325 Wed Jul 01, 2020 2028 Patient is a 31-year-old male coming in after he relapsed on heroin  On exam he is visibly intoxicated on opioids  Will check glucose, opioids and drug screen and have post evaluate patient    Portions of the record may have been created with voice recognition software  Occasional wrong word or "sound a like" substitutions may have occurred due to the inherent limitations of voice recognition software  Read the chart carefully and recognize, using context, where substitutions have occurred  2031 With alcohol negative  Glucose 109  HOST consult placed  2058 Patient falls asleep but awakens easily  Tolerating po well       2134 Patient was ambulating and tolerating p o  Well    Patient climbed into bed and had several drops of O2 with a good plus  Will give Narcan      2155 After narcan, patient remains with O2 sats >93% on RA      2310 Per staff, patient walked out of ER  Patient was positive for cocaine and opiate  No SI or HI on my exam            US AUDIT      Most Recent Value   Initial Alcohol Screen: US AUDIT-C    1  How often do you have a drink containing alcohol?  0 Filed at: 07/01/2020 2014   2  How many drinks containing alcohol do you have on a typical day you are drinking? 0 Filed at: 07/01/2020 2014   3a  Male UNDER 65: How often do you have five or more drinks on one occasion? 0 Filed at: 07/01/2020 2014   Audit-C Score  0 Filed at: 07/01/2020 2014                  LAM/DAST-10      Most Recent Value   How many times in the past year have you    Used an illegal drug or used a prescription medication for non-medical reasons? Daily or Almost Daily Filed at: 07/01/2020 2014   In the past 12 months      1  Have you used drugs other than those required for medical reasons? 1 Filed at: 07/01/2020 2014   2  Do you use more than one drug at a time? 1 Filed at: 07/01/2020 2014   3  Have you had medical problems as a result of your drug use (e g , memory loss, hepatitis, convulsions, bleeding, etc )? 1 Filed at: 07/01/2020 2014   4  Have you had "blackouts" or "flashbacks" as a result of drug use? YesNo  1 Filed at: 07/01/2020 2014   5  Do you ever feel bad or guilty about your drug use? 1 Filed at: 07/01/2020 2014   6  Does your spouse (or parent) ever complain about your involvement with drugs? 0 Filed at: 07/01/2020 2014   7  Have you neglected your family because of your use of drugs? 1 Filed at: 07/01/2020 2014   8  Have you engaged in illegal activities in order to obtain drugs? 0 Filed at: 07/01/2020 2014   9  Have you ever experienced withdrawal symptoms (felt sick) when you stopped taking drugs? 1 Filed at: 07/01/2020 2014   10   Are you always able to stop using drugs when you want to?  1 Filed at: 07/01/2020 2014   DAST-10 Score  (!) 8 Filed at: 07/01/2020 2014                                MDM      Disposition  Final diagnoses:   Cocaine abuse ([facility] [address] )   Heroin abuse ([facility] [address] )     Time reflects when diagnosis was documented in both MDM as applicable and the Disposition within this note     Time User Action Codes Description Comment    7/1/2020 11:24 PM Nimco Jasmine Add [F14 10] Cocaine abuse ([facility] [address] )     7/1/2020 11:24 PM Aneta Jasmine Add [F11 10] Heroin abuse [facility])       ED Disposition     ED Disposition Condition Date/Time Comment    Eloped  Wed Jul 1, 2020 11:24 PM       Follow-up Information    None         Patient's Medications   Discharge Prescriptions    No medications on file     No discharge procedures on file      PDMP Review     None          ED Provider  Electronically Signed by           Louisa Lizarraga DO  07/01/20 4378

## 2020-07-02 NOTE — ED NOTES
Pt on phone with [facility], RN  07/01/20 3531 - - -

## 2020-07-02 NOTE — ED NOTES
Pt walked out of ER without telling anyone  Provider made aware        Rachana Marquez, HERMINIO  07/01/20 9535

## 2020-08-14 ENCOUNTER — HOSPITAL ENCOUNTER (EMERGENCY)
Facility: HOSPITAL | Age: 30
Discharge: HOME | End: 2020-08-14
Attending: EMERGENCY MEDICINE | Admitting: EMERGENCY MEDICINE
Payer: COMMERCIAL

## 2020-08-14 VITALS
SYSTOLIC BLOOD PRESSURE: 179 MMHG | RESPIRATION RATE: 20 BRPM | OXYGEN SATURATION: 99 % | TEMPERATURE: 98 F | HEART RATE: 98 BPM | DIASTOLIC BLOOD PRESSURE: 75 MMHG

## 2020-08-14 DIAGNOSIS — T40.1X1A HEROIN OVERDOSE (HCC): Primary | ICD-10-CM

## 2020-08-14 PROCEDURE — 99282 EMERGENCY DEPT VISIT SF MDM: CPT | Performed by: EMERGENCY MEDICINE

## 2020-08-14 PROCEDURE — 99284 EMERGENCY DEPT VISIT MOD MDM: CPT

## 2020-08-14 RX ORDER — NALOXONE HYDROCHLORIDE 1 MG/ML
2 INJECTION INTRAMUSCULAR; INTRAVENOUS; SUBCUTANEOUS ONCE
Status: DISCONTINUED | OUTPATIENT
Start: 2020-08-14 | End: 2020-08-15 | Stop reason: HOSPADM

## 2020-08-14 RX ORDER — NALOXONE HYDROCHLORIDE 1 MG/ML
1 INJECTION PARENTERAL ONCE
Status: COMPLETED | OUTPATIENT
Start: 2020-08-14 | End: 2020-08-14

## 2020-08-15 NOTE — ED PROVIDER NOTES
History  Chief Complaint   Patient presents with    Overdose - Accidental     pt found down in road, given 4mg IV narcan by EMS  awake and alert on arrival      Patient is a 68-year-old male with a history of previous drug abuse was brought in after being found unresponsive on a local street  Local bystanders tried pouring water on patient with out any relief  Patient was given 2 mg of intranasal Narcan with resolution of symptoms  Patient meds to snorting to bags earlier tonight  Cannot remember patient's last use  Denies any history of suicidal ideation with use  None       Past Medical History:   Diagnosis Date    Hepatitis C        History reviewed  No pertinent surgical history  Family History   Problem Relation Age of Onset    Hepatitis Mother     HIV Mother     Heart attack Father      I have reviewed and agree with the history as documented  E-Cigarette/Vaping     E-Cigarette/Vaping Substances     Social History     Tobacco Use    Smoking status: Current Every Day Smoker     Packs/day: 0 25     Years: 15 00     Pack years: 3 75     Types: Cigarettes    Smokeless tobacco: Never Used   Substance Use Topics    Alcohol use: Yes     Frequency: Monthly or less     Drinks per session: 1 or 2     Binge frequency: Never     Comment: "weekends"    Drug use: Yes     Types: Heroin, "Crack" cocaine       Review of Systems   Constitutional: Positive for activity change  HENT: Negative  Eyes: Negative  Respiratory: Negative  Cardiovascular: Negative  Gastrointestinal: Negative  Endocrine: Negative  Genitourinary: Negative  Musculoskeletal: Negative  Skin: Negative  Allergic/Immunologic: Negative  Neurological: Negative  Hematological: Negative  Psychiatric/Behavioral: Negative  All other systems reviewed and are negative  Physical Exam  Physical Exam  Vitals signs and nursing note reviewed  Constitutional:       Appearance: Normal appearance   He is normal weight  Comments: Patient is closer soaking wet   HENT:      Head: Normocephalic  Right Ear: Tympanic membrane, ear canal and external ear normal       Left Ear: Tympanic membrane, ear canal and external ear normal       Nose: Nose normal       Mouth/Throat:      Mouth: Mucous membranes are moist       Pharynx: Oropharynx is clear  Eyes:      Conjunctiva/sclera: Conjunctivae normal       Pupils: Pupils are equal, round, and reactive to light  Neck:      Musculoskeletal: Normal range of motion and neck supple  Cardiovascular:      Rate and Rhythm: Normal rate and regular rhythm  Pulmonary:      Effort: Pulmonary effort is normal       Breath sounds: Normal breath sounds  Abdominal:      General: Abdomen is flat  Bowel sounds are normal    Musculoskeletal: Normal range of motion  Skin:     General: Skin is warm and dry  Capillary Refill: Capillary refill takes less than 2 seconds  Neurological:      General: No focal deficit present  Mental Status: He is alert  Mental status is at baseline  Psychiatric:         Mood and Affect: Mood normal          Behavior: Behavior normal          Thought Content:  Thought content normal          Vital Signs  ED Triage Vitals [08/14/20 2159]   Temperature Pulse Respirations Blood Pressure SpO2   98 °F (36 7 °C) (!) 120 20 (!) 179/75 95 %      Temp Source Heart Rate Source Patient Position - Orthostatic VS BP Location FiO2 (%)   Tympanic Monitor Sitting Left arm --      Pain Score       --           Vitals:    08/14/20 2159   BP: (!) 179/75   Pulse: (!) 120   Patient Position - Orthostatic VS: Sitting         Visual Acuity      ED Medications  Medications   naloxone (NARCAN) injection 2 mg (has no administration in time range)   naloxone (FOR EMS ONLY) (NARCAN) 2 MG/2ML injection 2 mg (0 mg Does not apply Given to EMS 8/14/20 2159)       Diagnostic Studies  Results Reviewed     None                 No orders to display Procedures  Procedures         ED Course                                             MDM      Disposition  Final diagnoses:   None     ED Disposition     None      Follow-up Information    None         Patient's Medications    No medications on file     No discharge procedures on file      PDMP Review     None          ED Provider  Electronically Signed by           Fiona Galloway MD  08/15/20 7835

## 2020-08-25 ENCOUNTER — TELEPHONE (OUTPATIENT)
Dept: GASTROENTEROLOGY | Facility: CLINIC | Age: 30
End: 2020-08-25

## 2020-08-25 NOTE — TELEPHONE ENCOUNTER
FYI  Patient started mavyret x 8 weeks for treatment of hepatitis C but had a lapse in treatment due to going to rehab and auth expiring prior to second shipment  [facility] [facility] is now extended and patient will call to arrange delivery  It's also documented in Epic he was recently seen in ED on August 14 for heroin OD  I mailed script for bw to check viral load

## 2020-09-14 NOTE — TELEPHONE ENCOUNTER
I spoke to patient  He is doing well on mavyret although admits to lapse in treatment  He said he has approx 2 week supply of medication left  He will go to [facility]'s lab for bw

## 2020-09-30 NOTE — TELEPHONE ENCOUNTER
Patients GI provider:  Dr Anahi Angulo    Number to return call: [phone]   Counselor named Kia Dunham    Reason for call: Pt called stated he has a box of mavyret and would like to speak with someone       Scheduled procedure/appointment date if applicable: Apt/procedure n/a

## 2020-10-01 NOTE — TELEPHONE ENCOUNTER
Reached vm, left message he was to be on medication for 8 weeks  Please call back if need clarification

## 2020-10-09 ENCOUNTER — LAB (OUTPATIENT)
Dept: LAB | Facility: HOSPITAL | Age: 30
End: 2020-10-09
Attending: INTERNAL MEDICINE
Payer: COMMERCIAL

## 2020-10-09 DIAGNOSIS — B19.20 HEPATITIS C VIRUS INFECTION WITHOUT HEPATIC COMA, UNSPECIFIED CHRONICITY: ICD-10-CM

## 2020-10-09 LAB
ALBUMIN SERPL BCP-MCNC: 4.2 G/DL (ref 3.5–5)
ALP SERPL-CCNC: 65 U/L (ref 46–116)
ALT SERPL W P-5'-P-CCNC: 19 U/L (ref 12–78)
ANION GAP SERPL CALCULATED.3IONS-SCNC: 8 MMOL/L (ref 4–13)
AST SERPL W P-5'-P-CCNC: 11 U/L (ref 5–45)
BASOPHILS # BLD AUTO: 0.04 THOUSANDS/ΜL (ref 0–0.1)
BASOPHILS NFR BLD AUTO: 1 % (ref 0–1)
BILIRUB SERPL-MCNC: 0.46 MG/DL (ref 0.2–1)
BUN SERPL-MCNC: 22 MG/DL (ref 5–25)
CALCIUM SERPL-MCNC: 9.3 MG/DL (ref 8.3–10.1)
CHLORIDE SERPL-SCNC: 101 MMOL/L (ref 100–108)
CO2 SERPL-SCNC: 28 MMOL/L (ref 21–32)
CREAT SERPL-MCNC: 1.17 MG/DL (ref 0.6–1.3)
EOSINOPHIL # BLD AUTO: 0.24 THOUSAND/ΜL (ref 0–0.61)
EOSINOPHIL NFR BLD AUTO: 3 % (ref 0–6)
ERYTHROCYTE [DISTWIDTH] IN BLOOD BY AUTOMATED COUNT: 12.5 % (ref 11.6–15.1)
GFR SERPL CREATININE-BSD FRML MDRD: 84 ML/MIN/1.73SQ M
GLUCOSE P FAST SERPL-MCNC: 97 MG/DL (ref 65–99)
HCT VFR BLD AUTO: 42.9 % (ref 36.5–49.3)
HGB BLD-MCNC: 14.5 G/DL (ref 12–17)
IMM GRANULOCYTES # BLD AUTO: 0.02 THOUSAND/UL (ref 0–0.2)
IMM GRANULOCYTES NFR BLD AUTO: 0 % (ref 0–2)
LYMPHOCYTES # BLD AUTO: 1.59 THOUSANDS/ΜL (ref 0.6–4.47)
LYMPHOCYTES NFR BLD AUTO: 22 % (ref 14–44)
MCH RBC QN AUTO: 33.4 PG (ref 26.8–34.3)
MCHC RBC AUTO-ENTMCNC: 33.8 G/DL (ref 31.4–37.4)
MCV RBC AUTO: 99 FL (ref 82–98)
MONOCYTES # BLD AUTO: 0.93 THOUSAND/ΜL (ref 0.17–1.22)
MONOCYTES NFR BLD AUTO: 13 % (ref 4–12)
NEUTROPHILS # BLD AUTO: 4.57 THOUSANDS/ΜL (ref 1.85–7.62)
NEUTS SEG NFR BLD AUTO: 61 % (ref 43–75)
NRBC BLD AUTO-RTO: 0 /100 WBCS
PLATELET # BLD AUTO: 321 THOUSANDS/UL (ref 149–390)
PMV BLD AUTO: 9.6 FL (ref 8.9–12.7)
POTASSIUM SERPL-SCNC: 4 MMOL/L (ref 3.5–5.3)
PROT SERPL-MCNC: 7.9 G/DL (ref 6.4–8.2)
RBC # BLD AUTO: 4.34 MILLION/UL (ref 3.88–5.62)
SODIUM SERPL-SCNC: 137 MMOL/L (ref 136–145)
WBC # BLD AUTO: 7.39 THOUSAND/UL (ref 4.31–10.16)

## 2020-10-09 PROCEDURE — 36415 COLL VENOUS BLD VENIPUNCTURE: CPT

## 2020-10-09 PROCEDURE — 80053 COMPREHEN METABOLIC PANEL: CPT

## 2020-10-09 PROCEDURE — 87522 HEPATITIS C REVRS TRNSCRPJ: CPT

## 2020-10-09 PROCEDURE — 85025 COMPLETE CBC W/AUTO DIFF WBC: CPT

## 2020-10-09 NOTE — TELEPHONE ENCOUNTER
I spoke to patient  He said he was in rehab and then recently had emergent appendectomy in [place]  He said he realized 2 weeks worth of mavyret were missing when he got home; tried calling hospital but was unable to recover  I spoke to Tarik Cardoso at [facility]; suggested I call perform rx, filling pharmacy; however after calling them they were unable to assist   I called insurance company [phone]  [facility] is requiring a new prior auth  I discussed with Gordon at [facility]  He will discuss case with pharmacist   As there was a lapse in treatment and we are unable to confirm missing dose, may be in best interest of patient to have viral load taken  If failed treatment, we can submit new PA

## 2020-10-09 NOTE — TELEPHONE ENCOUNTER
Recommendation from pharmacist is to have bw drawn  If treatment failure, will resubmit new PA to [facility]  Left vm for patient to advise to have bw done when able

## 2020-10-11 LAB
HCV RNA SERPL NAA+PROBE-ACNC: NORMAL IU/ML
TEST INFORMATION: NORMAL

## 2020-12-27 ENCOUNTER — HOSPITAL ENCOUNTER (EMERGENCY)
Facility: HOSPITAL | Age: 30
End: 2020-12-28
Attending: EMERGENCY MEDICINE
Payer: COMMERCIAL

## 2020-12-27 DIAGNOSIS — F19.90 IVDU (INTRAVENOUS DRUG USER): Primary | ICD-10-CM

## 2020-12-27 DIAGNOSIS — F32.A DEPRESSION: ICD-10-CM

## 2020-12-27 DIAGNOSIS — Z00.8 ENCOUNTER FOR PSYCHOLOGICAL EVALUATION: ICD-10-CM

## 2020-12-27 LAB
AMPHETAMINES SERPL QL SCN: POSITIVE
BARBITURATES UR QL: NEGATIVE
BENZODIAZ UR QL: NEGATIVE
COCAINE UR QL: POSITIVE
ETHANOL EXG-MCNC: 0 MG/DL
FLUAV RNA RESP QL NAA+PROBE: NEGATIVE
FLUBV RNA RESP QL NAA+PROBE: NEGATIVE
METHADONE UR QL: NEGATIVE
OPIATES UR QL SCN: NEGATIVE
OXYCODONE+OXYMORPHONE UR QL SCN: NEGATIVE
PCP UR QL: NEGATIVE
RSV RNA RESP QL NAA+PROBE: NEGATIVE
SARS-COV-2 RNA RESP QL NAA+PROBE: NEGATIVE
THC UR QL: POSITIVE

## 2020-12-27 PROCEDURE — 0241U HB NFCT DS VIR RESP RNA 4 TRGT: CPT | Performed by: EMERGENCY MEDICINE

## 2020-12-27 PROCEDURE — 99285 EMERGENCY DEPT VISIT HI MDM: CPT | Performed by: EMERGENCY MEDICINE

## 2020-12-27 PROCEDURE — 99285 EMERGENCY DEPT VISIT HI MDM: CPT

## 2020-12-27 PROCEDURE — 82075 ASSAY OF BREATH ETHANOL: CPT | Performed by: EMERGENCY MEDICINE

## 2020-12-27 PROCEDURE — 80307 DRUG TEST PRSMV CHEM ANLYZR: CPT | Performed by: EMERGENCY MEDICINE

## 2020-12-27 RX ORDER — LORAZEPAM 0.5 MG/1
1 TABLET ORAL ONCE
Status: COMPLETED | OUTPATIENT
Start: 2020-12-27 | End: 2020-12-27

## 2020-12-27 RX ADMIN — LORAZEPAM 1 MG: 0.5 TABLET ORAL at 21:16

## 2020-12-28 VITALS
TEMPERATURE: 97.9 F | RESPIRATION RATE: 16 BRPM | BODY MASS INDEX: 29.84 KG/M2 | SYSTOLIC BLOOD PRESSURE: 127 MMHG | WEIGHT: 220 LBS | HEART RATE: 83 BPM | DIASTOLIC BLOOD PRESSURE: 67 MMHG | OXYGEN SATURATION: 97 %

## 2020-12-28 RX ORDER — LORAZEPAM 0.5 MG/1
1 TABLET ORAL ONCE
Status: COMPLETED | OUTPATIENT
Start: 2020-12-28 | End: 2020-12-28

## 2020-12-28 RX ADMIN — LORAZEPAM 1 MG: 0.5 TABLET ORAL at 12:49

## 2021-02-19 ENCOUNTER — OCCMED (OUTPATIENT)
Dept: URGENT CARE | Facility: MEDICAL CENTER | Age: 31
End: 2021-02-19

## 2021-02-19 DIAGNOSIS — Z11.59 SPECIAL SCREENING EXAMINATION FOR UNSPECIFIED VIRAL DISEASE: Primary | ICD-10-CM

## 2021-02-19 PROCEDURE — 87635 SARS-COV-2 COVID-19 AMP PRB: CPT

## 2021-02-21 ENCOUNTER — TELEPHONE (OUTPATIENT)
Dept: URGENT CARE | Facility: MEDICAL CENTER | Age: 31
End: 2021-02-21

## 2021-02-21 LAB — SARS-COV-2 RNA RESP QL NAA+PROBE: NEGATIVE

## 2021-02-21 NOTE — TELEPHONE ENCOUNTER
Patient was called with COVID 19 results  Patient reports feeling ok  Patient was told he is negative for COVID 19  Patient was told to go ED if he experiences chest pain, shortness of breath or loss of taste and smell

## 2021-04-07 ENCOUNTER — TELEPHONE (OUTPATIENT)
Dept: FAMILY MEDICINE CLINIC | Facility: CLINIC | Age: 31
End: 2021-04-07

## 2021-04-07 NOTE — TELEPHONE ENCOUNTER
Fax received from [facility] treatment center needing review  Placed in Dr Noris Menon folder in preceptor room  Please advise  Thank you

## 2021-05-04 ENCOUNTER — HOSPITAL ENCOUNTER (EMERGENCY)
Facility: HOSPITAL | Age: 31
Discharge: HOME | End: 2021-05-04
Attending: EMERGENCY MEDICINE | Admitting: EMERGENCY MEDICINE
Payer: COMMERCIAL

## 2021-05-04 VITALS
TEMPERATURE: 97.4 F | DIASTOLIC BLOOD PRESSURE: 94 MMHG | BODY MASS INDEX: 25.06 KG/M2 | RESPIRATION RATE: 16 BRPM | WEIGHT: 185 LBS | HEIGHT: 72 IN | HEART RATE: 93 BPM | SYSTOLIC BLOOD PRESSURE: 163 MMHG | OXYGEN SATURATION: 99 %

## 2021-05-04 DIAGNOSIS — K14.6 TONGUE PAIN: Primary | ICD-10-CM

## 2021-05-04 PROCEDURE — 99283 EMERGENCY DEPT VISIT LOW MDM: CPT

## 2021-05-04 PROCEDURE — 99284 EMERGENCY DEPT VISIT MOD MDM: CPT | Performed by: EMERGENCY MEDICINE

## 2021-05-04 PROCEDURE — 96372 THER/PROPH/DIAG INJ SC/IM: CPT

## 2021-05-04 RX ORDER — ACETAMINOPHEN 325 MG/1
975 TABLET ORAL ONCE
Status: COMPLETED | OUTPATIENT
Start: 2021-05-04 | End: 2021-05-04

## 2021-05-04 RX ORDER — LIDOCAINE HYDROCHLORIDE 20 MG/ML
15 SOLUTION OROPHARYNGEAL 4 TIMES DAILY PRN
Qty: 60 ML | Refills: 0 | Status: SHIPPED | OUTPATIENT
Start: 2021-05-04 | End: 2021-05-05

## 2021-05-04 RX ORDER — LIDOCAINE HYDROCHLORIDE 20 MG/ML
15 SOLUTION OROPHARYNGEAL ONCE
Status: COMPLETED | OUTPATIENT
Start: 2021-05-04 | End: 2021-05-04

## 2021-05-04 RX ORDER — KETOROLAC TROMETHAMINE 30 MG/ML
15 INJECTION, SOLUTION INTRAMUSCULAR; INTRAVENOUS ONCE
Status: COMPLETED | OUTPATIENT
Start: 2021-05-04 | End: 2021-05-04

## 2021-05-04 RX ADMIN — ACETAMINOPHEN 975 MG: 325 TABLET, FILM COATED ORAL at 15:29

## 2021-05-04 RX ADMIN — KETOROLAC TROMETHAMINE 15 MG: 30 INJECTION, SOLUTION INTRAMUSCULAR at 15:30

## 2021-05-04 RX ADMIN — LIDOCAINE HYDROCHLORIDE 15 ML: 20 SOLUTION ORAL; TOPICAL at 15:30

## 2021-05-04 NOTE — ED ATTENDING ATTESTATION
Final Diagnosis:  1  Tongue pain           I, Lori Linares MD, saw and evaluated the patient  All available labs and X-rays were ordered by me or the resident and have been reviewed by myself  I discussed the patient with the resident / non-physician and agree with the resident's / non-physician practitioner's findings and plan as documented in the resident's / non-physician practicitioner's note, except where noted  At this point, I agree with the current assessment done in the ED  I was present during key portions of all procedures performed unless otherwise stated  Chief Complaint   Patient presents with    Medical Problem     Pt reports 6 days ago he noticed his tounge started hurting  This is a 27 y o  male presenting for evaluation of tongue pain x6 days  Unclear etiology  Thought about advil, but came to ER instead  No f/ch/n/cp/sob  No tooth pain  Denies any upper respiratory tract infection symptoms (cough, congestion, rhinorrhea, sore throat)  No swollen throat  +vomiting yesterday NBNB but feels fine today without emesis  PMH:   has a past medical history of Hepatitis C     PSH:   has no past surgical history on file  Social:  Social History     Substance and Sexual Activity   Alcohol Use Yes    Frequency: Monthly or less    Drinks per session: 1 or 2    Binge frequency: Never    Comment: "weekends"     Social History     Tobacco Use   Smoking Status Current Every Day Smoker    Packs/day: 0 25    Years: 15 00    Pack years: 3 75    Types: Cigarettes   Smokeless Tobacco Never Used     Social History     Substance and Sexual Activity   Drug Use Yes    Types: Heroin, "Crack" cocaine     PE:  Vitals:    05/04/21 1512   BP: 163/94   Pulse: 93   Resp: 16   Temp: (!) 97 4 °F (36 3 °C)   TempSrc: Tympanic   SpO2: 99%   Weight: 83 9 kg (185 lb)   Height: 6' (1 829 m)   General: VSS, NAD, awake, alert  Well-nourished, well-developed  Appears stated age     Head: Normocephalic, atraumatic, nontender  Eyes: PERRL, EOM-I  No diplopia  No hyphema  No subconjunctival hemorrhages  Symmetrical lids  ENTAtraumatic external nose and ears  MMM  Looks like non-specific tongue bite at the very tip of his tongue  It doesn't appear infected  It's focally tender  There are no lymph nodes noted  No change in voice  No uvular deviation  No lateral tongue biting  No stridor  Normal phonation  No drooling  Base of mouth is soft  No mastoid tenderness  Neck: Symmetric, trachea midline  No JVD  CV: Peripheral pulses +2 throughout  No chest wall tenderness  Lungs:   Unlabored   No retractions  No crepitus  No tachypnea  No paradoxical motion  Abd: +BS, soft, NT/ND    MSK:   FROM   No lower extremity edema  Back:   No CVAT  Skin: Dry, intact  Neuro: AAOx3, GCS 15, CN II-XII grossly intact  Motor grossly intact  Psychiatric/Behavioral: Appropriate mood and affect   Exam: deferred  A:  - Tongue problem  P:  - Lidocaine 2% topically x1 day  - tylenol/toradol  - f/u PCP  - doubt seizure especially b/c it's just the tip  - Discussed RTER precautions  - 13 point ROS was performed and all are normal unless stated in the history above  - Nursing note reviewed  Vitals reviewed  - Orders placed by myself and/or advanced practitioner / resident     - Previous chart was reviewed  - No language barrier    - History obtained from patient  - There are no limitations to the history obtained  - Critical care time: Not applicable for this patient       Code Status: No Order  Advance Directive and Living Will:      Power of :    POLST:      Medications   Lidocaine Viscous HCl (XYLOCAINE) 2 % mucosal solution 15 mL (15 mL Swish & Spit Given 5/4/21 1530)   acetaminophen (TYLENOL) tablet 975 mg (975 mg Oral Given 5/4/21 1529)   ketorolac (TORADOL) injection 15 mg (15 mg Intramuscular Given 5/4/21 1530)     No orders to display     No orders of the defined types were placed in this encounter  Labs Reviewed - No data to display  Time reflects when diagnosis was documented in both MDM as applicable and the Disposition within this note     Time User Action Codes Description Comment    5/4/2021  4:03 PM Roni Lemme Add [K14 6] Tongue pain       ED Disposition     ED Disposition Condition Date/Time Comment    Discharge Good Tue May 4, 2021  4:03 PM Lorie Ward discharge to home/self care  Follow-up Information     Follow up With Specialties Details Why [address] [place] [address],[address], DO Family Medicine Schedule an appointment as soon as possible for a visit in 1 week  [address] [place] [address]  [phone]          Discharge Medication List as of 5/4/2021  4:06 PM      START taking these medications    Details   Lidocaine Viscous HCl (XYLOCAINE) 2 % mucosal solution Swish and spit 15 mL 4 (four) times a day as needed for mouth pain or discomfort for up to 1 day, Starting Tue 5/4/2021, Until Wed 5/5/2021, Normal           No discharge procedures on file  None       Portions of the record may have been created with voice recognition software  Occasional wrong word or "sound a like" substitutions may have occurred due to the inherent limitations of voice recognition software  Read the chart carefully and recognize, using context, where substitutions have occurred      Electronically signed by:  Ritu Seaman

## 2021-05-04 NOTE — ED PROVIDER NOTES
History  Chief Complaint   Patient presents with    Medical Problem     Pt reports 6 days ago he noticed his tounge started hurting  80-year-old male presents with tongue pain  Patient says that the pain began 6 days ago  He does not recall any kind of inciting event  He does not recall biting his tongue  He just feels like his tongue hurts all over  He thinks that is also little swollen  He has no lip swelling  No family history of angioedema  He does not feel like his throat is sore or that it is closing  No trouble breathing  No fevers or chills  No dental pain  Patient also notes that he vomited a bunch of times yesterday, but says that he feels fine today  Patient says that he thought about taking some Advil for the pain, but decided to come into the ED to be evaluated instead  None       Past Medical History:   Diagnosis Date    Hepatitis C        History reviewed  No pertinent surgical history  Family History   Problem Relation Age of Onset    Hepatitis Mother     HIV Mother     Heart attack Father      I have reviewed and agree with the history as documented  E-Cigarette/Vaping     E-Cigarette/Vaping Substances     Social History     Tobacco Use    Smoking status: Current Every Day Smoker     Packs/day: 0 25     Years: 15 00     Pack years: 3 75     Types: Cigarettes    Smokeless tobacco: Never Used   Substance Use Topics    Alcohol use: Yes     Frequency: Monthly or less     Drinks per session: 1 or 2     Binge frequency: Never     Comment: "weekends"    Drug use: Yes     Types: Heroin, "Crack" cocaine        Review of Systems   Constitutional: Negative for appetite change, chills, fatigue and fever  HENT: Negative  Negative for congestion, dental problem, drooling, facial swelling, mouth sores, rhinorrhea, sore throat, trouble swallowing and voice change  Eyes: Negative  Respiratory: Negative for cough, chest tightness and shortness of breath  Cardiovascular: Negative for chest pain and palpitations  Gastrointestinal: Negative for abdominal pain, diarrhea, nausea and vomiting  Endocrine: Negative  Genitourinary: Negative for difficulty urinating and hematuria  Musculoskeletal: Negative for arthralgias and myalgias  Skin: Negative for pallor and rash  Allergic/Immunologic: Negative  Neurological: Negative for dizziness, weakness, light-headedness and headaches  Hematological: Negative  Physical Exam  ED Triage Vitals [05/04/21 1512]   Temperature Pulse Respirations Blood Pressure SpO2   (!) 97 4 °F (36 3 °C) 93 16 163/94 99 %      Temp Source Heart Rate Source Patient Position - Orthostatic VS BP Location FiO2 (%)   Tympanic Monitor -- -- --      Pain Score       Worst Possible Pain             Orthostatic Vital Signs  Vitals:    05/04/21 1512   BP: 163/94   Pulse: 93       Physical Exam  Vitals signs and nursing note reviewed  Constitutional:       General: He is not in acute distress  Appearance: Normal appearance  He is not ill-appearing  HENT:      Head: Normocephalic and atraumatic  Mouth/Throat:      Lips: Pink  No lesions  Mouth: Mucous membranes are moist  No injury, lacerations, oral lesions or angioedema  Dentition: Normal dentition  No dental tenderness, gingival swelling, dental abscesses or gum lesions  Tongue: Lesions (Healed over bite on the tip of the tongue) present  Palate: No mass and lesions  Pharynx: Oropharynx is clear  No oropharyngeal exudate or posterior oropharyngeal erythema  Tonsils: No tonsillar exudate or tonsillar abscesses  Eyes:      Conjunctiva/sclera: Conjunctivae normal    Neck:      Musculoskeletal: Normal range of motion and neck supple  Cardiovascular:      Rate and Rhythm: Normal rate and regular rhythm  Pulmonary:      Effort: Pulmonary effort is normal    Musculoskeletal: Normal range of motion  Skin:     General: Skin is warm and dry  Neurological:      General: No focal deficit present  Mental Status: He is alert and oriented to person, place, and time  ED Medications  Medications   Lidocaine Viscous HCl (XYLOCAINE) 2 % mucosal solution 15 mL (15 mL Swish & Spit Given 5/4/21 1530)   acetaminophen (TYLENOL) tablet 975 mg (975 mg Oral Given 5/4/21 1529)   ketorolac (TORADOL) injection 15 mg (15 mg Intramuscular Given 5/4/21 1530)       Diagnostic Studies  Results Reviewed     None                 No orders to display         Procedures  Procedures      ED Course                                       MDM  Number of Diagnoses or Management Options  Tongue pain: established and improving  Diagnosis management comments: 59-year-old male presents with tongue pain  Patient seemed to have a healed over laceration on the tip of his tongue  No signs of infection  Will treat with lidocaine, Toradol, and Tylenol  Amount and/or Complexity of Data Reviewed  Review and summarize past medical records: yes  Discuss the patient with other providers: yes    Risk of Complications, Morbidity, and/or Mortality  Presenting problems: minimal  Diagnostic procedures: minimal  Management options: minimal    Patient Progress  Patient progress: improved    Patient was discharged with a prescription for viscous lidocaine to use for the next day  He was told that he can take Advil or Tylenol for pain as well  Patient was told to follow-up with his primary care physician  Return precautions given  Disposition  Final diagnoses:   Tongue pain     Time reflects when diagnosis was documented in both MDM as applicable and the Disposition within this note     Time User Action Codes Description Comment    5/4/2021  4:03 PM Robert Adams Add [K14 6] Tongue pain       ED Disposition     ED Disposition Condition Date/Time Comment    Discharge Good Tulaondra May 4, 2021  4:03 PM Javy Caballero discharge to home/self care              Follow-up Information     Follow up With Specialties Details Why Contact Info    Joann Palacios DO Family Medicine Schedule an appointment as soon as possible for a visit in 1 week  [facility] [address]  [phone]            Discharge Medication List as of 5/4/2021  4:06 PM      START taking these medications    Details   Lidocaine Viscous HCl (XYLOCAINE) 2 % mucosal solution Swish and spit 15 mL 4 (four) times a day as needed for mouth pain or discomfort for up to 1 day, Starting Tue 5/4/2021, Until Wed 5/5/2021, Normal           No discharge procedures on file  PDMP Review     None           ED Provider  Attending physically available and evaluated Kendrick Cabrales  SANDRA managed the patient along with the ED Attending      Electronically Signed by         Gallo Carter DO  05/04/21 2025

## 2021-05-04 NOTE — DISCHARGE INSTRUCTIONS
You have been seen for tongue pain  You should return to the ED if you develop fever, throat swelling, trouble breathing, or other worsening symptoms  Follow up with your primary care doctor  Use lidocaine swish and spit for the the next day  Take Advil or Tylenol for pain

## 2021-05-05 ENCOUNTER — HOSPITAL ENCOUNTER (EMERGENCY)
Facility: HOSPITAL | Age: 31
Discharge: HOME | End: 2021-05-05
Attending: EMERGENCY MEDICINE
Payer: COMMERCIAL

## 2021-05-05 VITALS
OXYGEN SATURATION: 100 % | WEIGHT: 197.97 LBS | HEART RATE: 84 BPM | SYSTOLIC BLOOD PRESSURE: 158 MMHG | BODY MASS INDEX: 26.85 KG/M2 | RESPIRATION RATE: 18 BRPM | TEMPERATURE: 97.7 F | DIASTOLIC BLOOD PRESSURE: 83 MMHG

## 2021-05-05 DIAGNOSIS — T40.1X1A HEROIN OVERDOSE, ACCIDENTAL OR UNINTENTIONAL, INITIAL ENCOUNTER (HCC): Primary | ICD-10-CM

## 2021-05-05 LAB
ANION GAP SERPL CALCULATED.3IONS-SCNC: 4 MMOL/L (ref 5–14)
BASOPHILS # BLD AUTO: 0 THOUSANDS/ΜL (ref 0–0.1)
BASOPHILS NFR BLD AUTO: 1 % (ref 0–1)
BUN SERPL-MCNC: 17 MG/DL (ref 5–25)
CALCIUM SERPL-MCNC: 8.5 MG/DL (ref 8.4–10.2)
CHLORIDE SERPL-SCNC: 106 MMOL/L (ref 97–108)
CO2 SERPL-SCNC: 26 MMOL/L (ref 22–30)
CREAT SERPL-MCNC: 1.04 MG/DL (ref 0.7–1.5)
EOSINOPHIL # BLD AUTO: 0.1 THOUSAND/ΜL (ref 0–0.4)
EOSINOPHIL NFR BLD AUTO: 2 % (ref 0–6)
ERYTHROCYTE [DISTWIDTH] IN BLOOD BY AUTOMATED COUNT: 13.6 %
GFR SERPL CREATININE-BSD FRML MDRD: 96 ML/MIN/1.73SQ M
GLUCOSE SERPL-MCNC: 102 MG/DL (ref 70–99)
HCT VFR BLD AUTO: 40 % (ref 41–53)
HGB BLD-MCNC: 13.6 G/DL (ref 13.5–17.5)
LYMPHOCYTES # BLD AUTO: 1.5 THOUSANDS/ΜL (ref 0.5–4)
LYMPHOCYTES NFR BLD AUTO: 23 % (ref 25–45)
MCH RBC QN AUTO: 32.2 PG (ref 26–34)
MCHC RBC AUTO-ENTMCNC: 34.1 G/DL (ref 31–36)
MCV RBC AUTO: 95 FL (ref 80–100)
MONOCYTES # BLD AUTO: 0.6 THOUSAND/ΜL (ref 0.2–0.9)
MONOCYTES NFR BLD AUTO: 10 % (ref 1–10)
NEUTROPHILS # BLD AUTO: 4.2 THOUSANDS/ΜL (ref 1.8–7.8)
NEUTS SEG NFR BLD AUTO: 65 % (ref 45–65)
PLATELET # BLD AUTO: 246 THOUSANDS/UL (ref 150–450)
PMV BLD AUTO: 8 FL (ref 8.9–12.7)
POTASSIUM SERPL-SCNC: 4.5 MMOL/L (ref 3.6–5)
RBC # BLD AUTO: 4.23 MILLION/UL (ref 4.5–5.9)
SODIUM SERPL-SCNC: 136 MMOL/L (ref 137–147)
WBC # BLD AUTO: 6.4 THOUSAND/UL (ref 4.5–11)

## 2021-05-05 PROCEDURE — 99284 EMERGENCY DEPT VISIT MOD MDM: CPT

## 2021-05-05 PROCEDURE — 96374 THER/PROPH/DIAG INJ IV PUSH: CPT

## 2021-05-05 PROCEDURE — 96375 TX/PRO/DX INJ NEW DRUG ADDON: CPT

## 2021-05-05 PROCEDURE — 80048 BASIC METABOLIC PNL TOTAL CA: CPT | Performed by: EMERGENCY MEDICINE

## 2021-05-05 PROCEDURE — 36415 COLL VENOUS BLD VENIPUNCTURE: CPT | Performed by: EMERGENCY MEDICINE

## 2021-05-05 PROCEDURE — 85025 COMPLETE CBC W/AUTO DIFF WBC: CPT | Performed by: EMERGENCY MEDICINE

## 2021-05-05 PROCEDURE — 99284 EMERGENCY DEPT VISIT MOD MDM: CPT | Performed by: PHYSICIAN ASSISTANT

## 2021-05-05 PROCEDURE — 96361 HYDRATE IV INFUSION ADD-ON: CPT

## 2021-05-05 RX ORDER — METOCLOPRAMIDE HYDROCHLORIDE 5 MG/ML
10 INJECTION INTRAMUSCULAR; INTRAVENOUS ONCE
Status: COMPLETED | OUTPATIENT
Start: 2021-05-05 | End: 2021-05-05

## 2021-05-05 RX ORDER — CLONIDINE HYDROCHLORIDE 0.1 MG/1
0.2 TABLET ORAL ONCE
Status: COMPLETED | OUTPATIENT
Start: 2021-05-05 | End: 2021-05-05

## 2021-05-05 RX ORDER — DIPHENHYDRAMINE HYDROCHLORIDE 50 MG/ML
25 INJECTION INTRAMUSCULAR; INTRAVENOUS ONCE
Status: COMPLETED | OUTPATIENT
Start: 2021-05-05 | End: 2021-05-05

## 2021-05-05 RX ORDER — NALOXONE HYDROCHLORIDE 1 MG/ML
INJECTION INTRAMUSCULAR; INTRAVENOUS; SUBCUTANEOUS
Status: COMPLETED
Start: 2021-05-05 | End: 2021-05-05

## 2021-05-05 RX ORDER — KETOROLAC TROMETHAMINE 30 MG/ML
15 INJECTION, SOLUTION INTRAMUSCULAR; INTRAVENOUS ONCE
Status: COMPLETED | OUTPATIENT
Start: 2021-05-05 | End: 2021-05-05

## 2021-05-05 RX ADMIN — SODIUM CHLORIDE 1000 ML: 0.9 INJECTION, SOLUTION INTRAVENOUS at 14:47

## 2021-05-05 RX ADMIN — DIPHENHYDRAMINE HYDROCHLORIDE 25 MG: 50 INJECTION, SOLUTION INTRAMUSCULAR; INTRAVENOUS at 14:39

## 2021-05-05 RX ADMIN — KETOROLAC TROMETHAMINE 15 MG: 30 INJECTION, SOLUTION INTRAMUSCULAR; INTRAVENOUS at 14:41

## 2021-05-05 RX ADMIN — METOCLOPRAMIDE 10 MG: 5 INJECTION, SOLUTION INTRAMUSCULAR; INTRAVENOUS at 14:42

## 2021-05-05 RX ADMIN — CLONIDINE HYDROCHLORIDE 0.2 MG: 0.1 TABLET ORAL at 14:38

## 2021-05-05 NOTE — ED PROVIDER NOTES
History  Chief Complaint   Patient presents with    Heroin Overdose - Accidental     shot 3 bags of dope, 4mg IN narcan given by [employer] on EMS arrival the pt was still unresponsive was given an additional 2 mg of IV narcan  which the pt woke and was responcive  arrives to the ED in withdaw from the additional dose of narcan     Patient is a 51-year-old male presents today after an overdose patient admits the use of 3 bags of heroin and 1 and half bags of cocaine patient reports he uses these recreational drugs intravenously and was using them in a recreational manner not intending to harm himself or commit suicide  Patient reports he uses these recreational drugs on a regular basis  Patient is accompanied by EMS reports the patient was given 4 mg of intranasal Narcan prior to their arrival and then 2 mg of IV Narcan upon arrival which caused the patient to return to a spontaneous respiratory pattern and normal level of consciousness  Patient arrives conscious alert oriented complaining of "withdrawals"  Patient reports he feels jittery and reports this is similar to previous withdrawals he has experienced  Patient reports body aches and nausea  History provided by:  Patient  Overdose - Accidental  Ingested substance:  Illicit drugs  Illicit drug type:  Cocaine (heroin)  Ingestion amount:  3 bags of heroin 1 5 bag cocaine IV  Context: intentional ingestion and recreational    Associated symptoms: no abdominal pain, no chest pain, no nausea, no shortness of breath and no vomiting        Prior to Admission Medications   Prescriptions Last Dose Informant Patient Reported? Taking? Lidocaine Viscous HCl (XYLOCAINE) 2 % mucosal solution   No No   Sig: Swish and spit 15 mL 4 (four) times a day as needed for mouth pain or discomfort for up to 1 day      Facility-Administered Medications: None       Past Medical History:   Diagnosis Date    Hepatitis C        History reviewed   No pertinent surgical history  Family History   Problem Relation Age of Onset    Hepatitis Mother     HIV Mother     Heart attack Father      I have reviewed and agree with the history as documented  E-Cigarette/Vaping     E-Cigarette/Vaping Substances    Nicotine No     THC No     CBD No     Flavoring No     Other No     Unknown No      Social History     Tobacco Use    Smoking status: Current Every Day Smoker     Packs/day: 0 25     Years: 15 00     Pack years: 3 75     Types: Cigarettes    Smokeless tobacco: Never Used   Substance Use Topics    Alcohol use: Yes     Frequency: Monthly or less     Drinks per session: 1 or 2     Binge frequency: Never     Comment: "weekends"    Drug use: Yes     Types: Heroin, "Crack" cocaine       Review of Systems   Constitutional: Negative for chills, fatigue and fever  HENT: Negative for congestion, ear pain, rhinorrhea and sore throat  Eyes: Negative for redness  Respiratory: Negative for chest tightness and shortness of breath  Cardiovascular: Negative for chest pain and palpitations  Gastrointestinal: Negative for abdominal pain, nausea and vomiting  Genitourinary: Negative for dysuria and hematuria  Musculoskeletal: Negative  Skin: Negative for rash  Neurological: Negative for dizziness, syncope, light-headedness and numbness  Psychiatric/Behavioral: The patient is nervous/anxious and is hyperactive  Physical Exam  Physical Exam  Vitals signs and nursing note reviewed  Constitutional:       Appearance: Normal appearance  He is well-developed  HENT:      Head: Normocephalic and atraumatic  Eyes:      General: No scleral icterus  Pupils: Pupils are equal, round, and reactive to light  Neck:      Musculoskeletal: Normal range of motion  Cardiovascular:      Rate and Rhythm: Normal rate and regular rhythm  Pulses: Normal pulses  Pulmonary:      Effort: Pulmonary effort is normal  No respiratory distress  Breath sounds:  No stridor  Abdominal:      General: There is no distension  Palpations: There is no mass  Skin:     General: Skin is warm and dry  Capillary Refill: Capillary refill takes less than 2 seconds  Coloration: Skin is not jaundiced  Neurological:      Mental Status: He is alert and oriented to person, place, and time        Gait: Gait normal    Psychiatric:         Mood and Affect: Mood normal          Vital Signs  ED Triage Vitals [05/05/21 1427]   Temperature Pulse Respirations Blood Pressure SpO2   97 7 °F (36 5 °C) (!) 113 (!) 28 139/88 100 %      Temp Source Heart Rate Source Patient Position - Orthostatic VS BP Location FiO2 (%)   Tympanic Monitor Sitting Right arm --      Pain Score       --           Vitals:    05/05/21 1427 05/05/21 1633   BP: 139/88 158/83   Pulse: (!) 113 84   Patient Position - Orthostatic VS: Sitting Sitting         Visual Acuity      ED Medications  Medications   cloNIDine (CATAPRES) tablet 0 2 mg (0 2 mg Oral Given 5/5/21 1438)   ketorolac (TORADOL) injection 15 mg (15 mg Intravenous Given 5/5/21 1441)   metoclopramide (REGLAN) injection 10 mg (10 mg Intravenous Given 5/5/21 1442)   diphenhydrAMINE (BENADRYL) injection 25 mg (25 mg Intravenous Given 5/5/21 1439)   sodium chloride 0 9 % bolus 1,000 mL (0 mL Intravenous Stopped 5/5/21 1631)   naloxone (NARCAN) 2 MG/2ML injection **ADS Override Pull** (  Given to EMS 5/5/21 1431)       Diagnostic Studies  Results Reviewed     Procedure Component Value Units Date/Time    Basic metabolic panel [[id number]]  (Abnormal) Collected: 05/05/21 1848    Lab Status: Final result Specimen: Blood from Hand, Left Updated: 05/05/21 1914     Sodium 136 mmol/L      Potassium 4 5 mmol/L      Chloride 106 mmol/L      CO2 26 mmol/L      ANION GAP 4 mmol/L      BUN 17 mg/dL      Creatinine 1 04 mg/dL      Glucose 102 mg/dL      Calcium 8 5 mg/dL      eGFR 96 ml/min/1 73sq m     Narrative:      Hemolysis  [place] guidelines for Chronic Kidney Disease (CKD):     Stage 1 with normal or high GFR (GFR > 90 mL/min/1 73 square meters)    Stage 2 Mild CKD (GFR = 60-89 mL/min/1 73 square meters)    Stage 3A Moderate CKD (GFR = 45-59 mL/min/1 73 square meters)    Stage 3B Moderate CKD (GFR = 30-44 mL/min/1 73 square meters)    Stage 4 Severe CKD (GFR = 15-29 mL/min/1 73 square meters)    Stage 5 End Stage CKD (GFR <15 mL/min/1 73 square meters)  Note: GFR calculation is accurate only with a steady state creatinine    CBC and differential [[id number]]  (Abnormal) Collected: 05/05/21 1855    Lab Status: Final result Specimen: Blood from Hand, Left Updated: 05/05/21 1913     WBC 6 40 Thousand/uL      RBC 4 23 Million/uL      Hemoglobin 13 6 g/dL      Hematocrit 40 0 %      MCV 95 fL      MCH 32 2 pg      MCHC 34 1 g/dL      RDW 13 6 %      MPV 8 0 fL      Platelets 863 Thousands/uL      Neutrophils Relative 65 %      Lymphocytes Relative 23 %      Monocytes Relative 10 %      Eosinophils Relative 2 %      Basophils Relative 1 %      Neutrophils Absolute 4 20 Thousands/µL      Lymphocytes Absolute 1 50 Thousands/µL      Monocytes Absolute 0 60 Thousand/µL      Eosinophils Absolute 0 10 Thousand/µL      Basophils Absolute 0 00 Thousands/µL                  No orders to display              Procedures  Procedures         ED Course  ED Course as of May 06 1303   Wed May 05, 2021   1522 Patient medically cleared      1601 Patient signed out to colleague Humberto Barrera PA-C awaiting HOST evaluation and placement, is ready for discharge and may leave of his own will if he feels so inclined even if prior to HOST placement for rehab  SBIRT 22yo+      Most Recent Value   SBIRT (24 yo +)   In order to provide better care to our patients, we are screening all of our patients for alcohol and drug use  Would it be okay to ask you these screening questions?   No Filed at: 05/05/2021 1449                    [facility]  Number of Diagnoses or Management Options  Diagnosis management comments: Strict return to ED precautions discussed  Patient recommended to follow up promptly with appropriate outpatient provider  Patient and/or family members verbalizes understanding and agrees with plan  Patient is stable for discharge      Portions of the record may have been created with voice recognition software  Occasional wrong word or "sound a like" substitutions may have occurred due to the inherent limitations of voice recognition software  Read the chart carefully and recognize, using context, where substitutions have occurred  Disposition  Final diagnoses:   Heroin overdose, accidental or unintentional, initial encounter [facility])     Time reflects when diagnosis was documented in both MDM as applicable and the Disposition within this note     Time User Action Codes Description Comment    5/5/2021  4:01 PM Aracely, [address] [facility]  Heroin overdose, accidental or unintentional, initial encounter [facility])       ED Disposition     ED Disposition Condition Date/Time Comment    Discharge Stable Wed May 5, 2021  4:01 PM Jennifer Farah discharge to home/self care  Follow-up Information     Follow up With Specialties Details Why [address] [place] [address],[address], DO Family Medicine Schedule an appointment as soon as possible for a visit  As needed Rissa Gaines [address] [facility]  [phone]          Please follow up with Pyramid tomorrow  Discharge Medication List as of 5/5/2021  7:49 PM      CONTINUE these medications which have NOT CHANGED    Details   Lidocaine Viscous HCl (XYLOCAINE) 2 % mucosal solution Swish and spit 15 mL 4 (four) times a day as needed for mouth pain or discomfort for up to 1 day, Starting Tue 5/4/2021, Until Wed 5/5/2021, Normal           No discharge procedures on file      PDMP Review     None          ED Provider  Electronically Signed by           Iain Liu PA-C  05/06/21 2103

## 2021-05-05 NOTE — ED NOTES
Pt stated that he is "Jonesing for a cigarette"  Pt was told that he is not able to leave to go smoke   Pt asked for coffee and this tech provided same     Samy Monte  05/05/21 1946

## 2021-05-05 NOTE — ED NOTES
Pt spoke to a host representative, who is performing a bed search        Favio Loza RN  05/05/21 4511

## 2021-05-05 NOTE — ED NOTES
Faxed records to [address] [place] [address] completed assessment and will try to find a bed for patient for detox

## 2023-05-27 ENCOUNTER — HOSPITAL ENCOUNTER (INPATIENT)
Facility: HOSPITAL | Age: 33
LOS: 3 days | Discharge: HOME | DRG: 773 | End: 2023-05-30
Attending: EMERGENCY MEDICINE | Admitting: EMERGENCY MEDICINE
Payer: COMMERCIAL

## 2023-05-27 DIAGNOSIS — F19.10 POLYSUBSTANCE ABUSE (HCC): ICD-10-CM

## 2023-05-27 DIAGNOSIS — F11.20 OPIOID USE DISORDER, SEVERE, DEPENDENCE (HCC): ICD-10-CM

## 2023-05-27 LAB
ALBUMIN SERPL BCP-MCNC: 4.6 G/DL (ref 3.5–5)
ALP SERPL-CCNC: 49 U/L (ref 34–104)
ALT SERPL W P-5'-P-CCNC: 31 U/L (ref 7–52)
AMPHETAMINES SERPL QL SCN: POSITIVE
ANION GAP SERPL CALCULATED.3IONS-SCNC: 7 MMOL/L (ref 4–13)
APAP SERPL-MCNC: <10 UG/ML (ref 10–20)
AST SERPL W P-5'-P-CCNC: 19 U/L (ref 13–39)
ATRIAL RATE: 63 BPM
BARBITURATES UR QL: NEGATIVE
BASOPHILS # BLD AUTO: 0.04 THOUSANDS/ÂΜL (ref 0–0.1)
BASOPHILS NFR BLD AUTO: 1 % (ref 0–1)
BENZODIAZ UR QL: NEGATIVE
BILIRUB SERPL-MCNC: 0.55 MG/DL (ref 0.2–1)
BUN SERPL-MCNC: 15 MG/DL (ref 5–25)
CALCIUM SERPL-MCNC: 9.6 MG/DL (ref 8.4–10.2)
CHLORIDE SERPL-SCNC: 101 MMOL/L (ref 96–108)
CO2 SERPL-SCNC: 31 MMOL/L (ref 21–32)
COCAINE UR QL: POSITIVE
CREAT SERPL-MCNC: 1.29 MG/DL (ref 0.6–1.3)
EOSINOPHIL # BLD AUTO: 0.05 THOUSAND/ÂΜL (ref 0–0.61)
EOSINOPHIL NFR BLD AUTO: 1 % (ref 0–6)
ERYTHROCYTE [DISTWIDTH] IN BLOOD BY AUTOMATED COUNT: 12.1 % (ref 11.6–15.1)
ETHANOL SERPL-MCNC: <10 MG/DL
GFR SERPL CREATININE-BSD FRML MDRD: 72 ML/MIN/1.73SQ M
GLUCOSE SERPL-MCNC: 89 MG/DL (ref 65–140)
HCT VFR BLD AUTO: 41.2 % (ref 36.5–49.3)
HGB BLD-MCNC: 14.1 G/DL (ref 12–17)
IMM GRANULOCYTES # BLD AUTO: 0.02 THOUSAND/UL (ref 0–0.2)
IMM GRANULOCYTES NFR BLD AUTO: 0 % (ref 0–2)
LYMPHOCYTES # BLD AUTO: 1.86 THOUSANDS/ÂΜL (ref 0.6–4.47)
LYMPHOCYTES NFR BLD AUTO: 33 % (ref 14–44)
MAGNESIUM SERPL-MCNC: 1.7 MG/DL (ref 1.9–2.7)
MCH RBC QN AUTO: 32 PG (ref 26.8–34.3)
MCHC RBC AUTO-ENTMCNC: 34.2 G/DL (ref 31.4–37.4)
MCV RBC AUTO: 94 FL (ref 82–98)
METHADONE UR QL: NEGATIVE
MONOCYTES # BLD AUTO: 0.5 THOUSAND/ÂΜL (ref 0.17–1.22)
MONOCYTES NFR BLD AUTO: 9 % (ref 4–12)
NEUTROPHILS # BLD AUTO: 3.19 THOUSANDS/ÂΜL (ref 1.85–7.62)
NEUTS SEG NFR BLD AUTO: 56 % (ref 43–75)
NRBC BLD AUTO-RTO: 0 /100 WBCS
OPIATES UR QL SCN: NEGATIVE
OXYCODONE+OXYMORPHONE UR QL SCN: NEGATIVE
P AXIS: 62 DEGREES
PCP UR QL: NEGATIVE
PLATELET # BLD AUTO: 252 THOUSANDS/UL (ref 149–390)
PMV BLD AUTO: 10 FL (ref 8.9–12.7)
POTASSIUM SERPL-SCNC: 4 MMOL/L (ref 3.5–5.3)
PR INTERVAL: 150 MS
PROT SERPL-MCNC: 7.1 G/DL (ref 6.4–8.4)
QRS AXIS: 52 DEGREES
QRSD INTERVAL: 84 MS
QT INTERVAL: 378 MS
QTC INTERVAL: 386 MS
RBC # BLD AUTO: 4.4 MILLION/UL (ref 3.88–5.62)
SALICYLATES SERPL-MCNC: <5 MG/DL (ref 3–20)
SODIUM SERPL-SCNC: 139 MMOL/L (ref 135–147)
T WAVE AXIS: 46 DEGREES
THC UR QL: POSITIVE
VENTRICULAR RATE: 63 BPM
WBC # BLD AUTO: 5.66 THOUSAND/UL (ref 4.31–10.16)

## 2023-05-27 PROCEDURE — 93005 ELECTROCARDIOGRAM TRACING: CPT

## 2023-05-27 PROCEDURE — 80143 DRUG ASSAY ACETAMINOPHEN: CPT

## 2023-05-27 PROCEDURE — 80307 DRUG TEST PRSMV CHEM ANLYZR: CPT

## 2023-05-27 PROCEDURE — 85025 COMPLETE CBC W/AUTO DIFF WBC: CPT

## 2023-05-27 PROCEDURE — 36415 COLL VENOUS BLD VENIPUNCTURE: CPT

## 2023-05-27 PROCEDURE — 82077 ASSAY SPEC XCP UR&BREATH IA: CPT

## 2023-05-27 PROCEDURE — 99284 EMERGENCY DEPT VISIT MOD MDM: CPT

## 2023-05-27 PROCEDURE — 80179 DRUG ASSAY SALICYLATE: CPT

## 2023-05-27 PROCEDURE — 83735 ASSAY OF MAGNESIUM: CPT

## 2023-05-27 PROCEDURE — 93010 ELECTROCARDIOGRAM REPORT: CPT | Performed by: INTERNAL MEDICINE

## 2023-05-27 PROCEDURE — 80053 COMPREHEN METABOLIC PANEL: CPT

## 2023-05-27 RX ORDER — BUPRENORPHINE 20 UG/H
20 PATCH TRANSDERMAL
Status: DISCONTINUED | OUTPATIENT
Start: 2023-05-27 | End: 2023-05-30

## 2023-05-27 RX ORDER — TRAZODONE HYDROCHLORIDE 50 MG/1
50 TABLET ORAL
Status: DISCONTINUED | OUTPATIENT
Start: 2023-05-27 | End: 2023-05-30 | Stop reason: HOSPADM

## 2023-05-27 RX ORDER — NICOTINE 21 MG/24HR
1 PATCH, TRANSDERMAL 24 HOURS TRANSDERMAL DAILY PRN
Status: DISCONTINUED | OUTPATIENT
Start: 2023-05-27 | End: 2023-05-28

## 2023-05-27 RX ORDER — NICOTINE 21 MG/24HR
21 PATCH, TRANSDERMAL 24 HOURS TRANSDERMAL ONCE
Status: DISCONTINUED | OUTPATIENT
Start: 2023-05-27 | End: 2023-05-28

## 2023-05-27 RX ORDER — ACETAMINOPHEN 325 MG/1
650 TABLET ORAL EVERY 6 HOURS PRN
Status: DISCONTINUED | OUTPATIENT
Start: 2023-05-27 | End: 2023-05-30 | Stop reason: HOSPADM

## 2023-05-27 RX ORDER — ONDANSETRON 2 MG/ML
4 INJECTION INTRAMUSCULAR; INTRAVENOUS EVERY 6 HOURS PRN
Status: DISCONTINUED | OUTPATIENT
Start: 2023-05-27 | End: 2023-05-30 | Stop reason: HOSPADM

## 2023-05-27 RX ORDER — SODIUM CHLORIDE 9 MG/ML
100 INJECTION, SOLUTION INTRAVENOUS CONTINUOUS
Status: DISCONTINUED | OUTPATIENT
Start: 2023-05-27 | End: 2023-05-28

## 2023-05-27 RX ORDER — ENOXAPARIN SODIUM 100 MG/ML
40 INJECTION SUBCUTANEOUS DAILY
Status: DISCONTINUED | OUTPATIENT
Start: 2023-05-28 | End: 2023-05-30 | Stop reason: HOSPADM

## 2023-05-27 RX ORDER — CLONIDINE HYDROCHLORIDE 0.1 MG/1
0.1 TABLET ORAL EVERY 6 HOURS PRN
Status: DISCONTINUED | OUTPATIENT
Start: 2023-05-27 | End: 2023-05-30

## 2023-05-27 RX ORDER — CLONAZEPAM 0.5 MG/1
1 TABLET ORAL EVERY 6 HOURS PRN
Status: DISCONTINUED | OUTPATIENT
Start: 2023-05-27 | End: 2023-05-30

## 2023-05-27 RX ORDER — MAGNESIUM SULFATE HEPTAHYDRATE 40 MG/ML
2 INJECTION, SOLUTION INTRAVENOUS ONCE
Status: COMPLETED | OUTPATIENT
Start: 2023-05-27 | End: 2023-05-28

## 2023-05-27 RX ADMIN — SODIUM CHLORIDE 100 ML/HR: 0.9 INJECTION, SOLUTION INTRAVENOUS at 22:44

## 2023-05-27 RX ADMIN — BUPRENORPHINE 20 MCG: 20 PATCH, EXTENDED RELEASE TRANSDERMAL at 22:48

## 2023-05-27 RX ADMIN — NICOTINE 21 MG: 21 PATCH, EXTENDED RELEASE TRANSDERMAL at 20:36

## 2023-05-27 RX ADMIN — MAGNESIUM SULFATE HEPTAHYDRATE 2 G: 2 INJECTION, SOLUTION INTRAVENOUS at 22:47

## 2023-05-27 NOTE — Clinical Note
Kj Schuler was seen and treated in our emergency department on 5/27/2023  No restrictions            Diagnosis:     Fco    He may return on this date: 05/28/2023         If you have any questions or concerns, please don't hesitate to call        Kasey Watkins PA-C    ______________________________           _______________          _______________  Hospital Representative                              Date                                Time

## 2023-05-28 PROBLEM — F11.93 OPIOID WITHDRAWAL (HCC): Status: ACTIVE | Noted: 2023-05-28

## 2023-05-28 PROBLEM — F19.10 POLYSUBSTANCE ABUSE (HCC): Status: ACTIVE | Noted: 2023-05-28

## 2023-05-28 PROBLEM — E83.42 HYPOMAGNESEMIA: Status: ACTIVE | Noted: 2023-05-28

## 2023-05-28 PROBLEM — F11.20 OPIOID USE DISORDER, SEVERE, DEPENDENCE (HCC): Status: ACTIVE | Noted: 2023-05-28

## 2023-05-28 PROBLEM — F17.200 TOBACCO USE DISORDER: Status: ACTIVE | Noted: 2023-05-28

## 2023-05-28 LAB
ALBUMIN SERPL BCP-MCNC: 3.7 G/DL (ref 3.5–5)
ALP SERPL-CCNC: 41 U/L (ref 34–104)
ALT SERPL W P-5'-P-CCNC: 27 U/L (ref 7–52)
ANION GAP SERPL CALCULATED.3IONS-SCNC: 6 MMOL/L (ref 4–13)
AST SERPL W P-5'-P-CCNC: 20 U/L (ref 13–39)
BILIRUB SERPL-MCNC: 0.27 MG/DL (ref 0.2–1)
BUN SERPL-MCNC: 14 MG/DL (ref 5–25)
CALCIUM SERPL-MCNC: 8.9 MG/DL (ref 8.4–10.2)
CHLORIDE SERPL-SCNC: 105 MMOL/L (ref 96–108)
CO2 SERPL-SCNC: 29 MMOL/L (ref 21–32)
CREAT SERPL-MCNC: 1.08 MG/DL (ref 0.6–1.3)
ERYTHROCYTE [DISTWIDTH] IN BLOOD BY AUTOMATED COUNT: 12 % (ref 11.6–15.1)
GFR SERPL CREATININE-BSD FRML MDRD: 90 ML/MIN/1.73SQ M
GLUCOSE SERPL-MCNC: 98 MG/DL (ref 65–140)
HCT VFR BLD AUTO: 37.3 % (ref 36.5–49.3)
HGB BLD-MCNC: 13 G/DL (ref 12–17)
HIV 1+2 AB+HIV1 P24 AG SERPL QL IA: NORMAL
HIV1 P24 AG SER QL: NORMAL
MAGNESIUM SERPL-MCNC: 2.2 MG/DL (ref 1.9–2.7)
MCH RBC QN AUTO: 32.5 PG (ref 26.8–34.3)
MCHC RBC AUTO-ENTMCNC: 34.9 G/DL (ref 31.4–37.4)
MCV RBC AUTO: 93 FL (ref 82–98)
PLATELET # BLD AUTO: 216 THOUSANDS/UL (ref 149–390)
PMV BLD AUTO: 10.6 FL (ref 8.9–12.7)
POTASSIUM SERPL-SCNC: 3.7 MMOL/L (ref 3.5–5.3)
PROT SERPL-MCNC: 6 G/DL (ref 6.4–8.4)
RBC # BLD AUTO: 4 MILLION/UL (ref 3.88–5.62)
SODIUM SERPL-SCNC: 140 MMOL/L (ref 135–147)
WBC # BLD AUTO: 5.25 THOUSAND/UL (ref 4.31–10.16)

## 2023-05-28 PROCEDURE — 85027 COMPLETE CBC AUTOMATED: CPT | Performed by: PHYSICIAN ASSISTANT

## 2023-05-28 PROCEDURE — 99291 CRITICAL CARE FIRST HOUR: CPT | Performed by: PHYSICIAN ASSISTANT

## 2023-05-28 PROCEDURE — 83735 ASSAY OF MAGNESIUM: CPT | Performed by: PHYSICIAN ASSISTANT

## 2023-05-28 PROCEDURE — 80053 COMPREHEN METABOLIC PANEL: CPT | Performed by: PHYSICIAN ASSISTANT

## 2023-05-28 PROCEDURE — 80074 ACUTE HEPATITIS PANEL: CPT | Performed by: EMERGENCY MEDICINE

## 2023-05-28 PROCEDURE — 87806 HIV AG W/HIV1&2 ANTB W/OPTIC: CPT | Performed by: EMERGENCY MEDICINE

## 2023-05-28 RX ORDER — NICOTINE 21 MG/24HR
1 PATCH, TRANSDERMAL 24 HOURS TRANSDERMAL DAILY
Status: DISCONTINUED | OUTPATIENT
Start: 2023-05-28 | End: 2023-05-30 | Stop reason: HOSPADM

## 2023-05-28 RX ORDER — BUPRENORPHINE 2 MG/1
0.5 TABLET SUBLINGUAL
Status: COMPLETED | OUTPATIENT
Start: 2023-05-28 | End: 2023-05-28

## 2023-05-28 RX ORDER — BUPRENORPHINE 2 MG/1
0.5 TABLET SUBLINGUAL ONCE
Status: COMPLETED | OUTPATIENT
Start: 2023-05-28 | End: 2023-05-28

## 2023-05-28 RX ORDER — BUPRENORPHINE AND NALOXONE 2; .5 MG/1; MG/1
2 FILM, SOLUBLE BUCCAL; SUBLINGUAL
Status: COMPLETED | OUTPATIENT
Start: 2023-05-28 | End: 2023-05-28

## 2023-05-28 RX ADMIN — BUPRENORPHINE AND NALOXONE 2 MG: 2; .5 FILM BUCCAL; SUBLINGUAL at 21:24

## 2023-05-28 RX ADMIN — MULTIPLE VITAMINS W/ MINERALS TAB 1 TABLET: TAB ORAL at 09:17

## 2023-05-28 RX ADMIN — CLONAZEPAM 1 MG: 0.5 TABLET ORAL at 09:17

## 2023-05-28 RX ADMIN — SODIUM CHLORIDE 100 ML/HR: 0.9 INJECTION, SOLUTION INTRAVENOUS at 09:17

## 2023-05-28 RX ADMIN — SODIUM CHLORIDE 100 ML/HR: 0.9 INJECTION, SOLUTION INTRAVENOUS at 20:00

## 2023-05-28 RX ADMIN — CLONIDINE HYDROCHLORIDE 0.1 MG: 0.1 TABLET ORAL at 11:50

## 2023-05-28 RX ADMIN — BUPRENORPHINE AND NALOXONE 2 MG: 2; .5 FILM BUCCAL; SUBLINGUAL at 19:39

## 2023-05-28 RX ADMIN — BUPRENORPHINE AND NALOXONE 2 MG: 2; .5 FILM BUCCAL; SUBLINGUAL at 22:32

## 2023-05-28 RX ADMIN — CLONAZEPAM 1 MG: 0.5 TABLET ORAL at 21:24

## 2023-05-28 RX ADMIN — CLONIDINE HYDROCHLORIDE 0.1 MG: 0.1 TABLET ORAL at 18:22

## 2023-05-28 RX ADMIN — Medication 0.5 MG: at 15:06

## 2023-05-28 RX ADMIN — ENOXAPARIN SODIUM 40 MG: 40 INJECTION SUBCUTANEOUS at 09:17

## 2023-05-28 RX ADMIN — CLONAZEPAM 1 MG: 0.5 TABLET ORAL at 15:22

## 2023-05-28 RX ADMIN — Medication 0.5 MG: at 11:45

## 2023-05-28 RX ADMIN — NICOTINE 1 PATCH: 21 PATCH, EXTENDED RELEASE TRANSDERMAL at 15:15

## 2023-05-28 RX ADMIN — Medication 0.5 MG: at 17:29

## 2023-05-28 RX ADMIN — ACETAMINOPHEN 650 MG: 325 TABLET ORAL at 19:39

## 2023-05-28 RX ADMIN — TRAZODONE HYDROCHLORIDE 50 MG: 50 TABLET ORAL at 22:32

## 2023-05-28 RX ADMIN — Medication 0.5 MG: at 13:06

## 2023-05-28 NOTE — ASSESSMENT & PLAN NOTE
· Endorses chronic use of marijuana, amphetamine and cocaine  · Admits last snorted cocaine 05/27/2023  · UDS positive for amphetamine/meth, THC and cocaine  · Cessation counseling provided  · Continue symptomatic management

## 2023-05-28 NOTE — ASSESSMENT & PLAN NOTE
· Endorses half a pack per day cigarette use  · Cigarette use cessation counseling provided  · NRT ordered

## 2023-05-28 NOTE — H&P
HISTORY & PHYSICAL EXAM  DEPARTMENT OF MEDICAL TOXICOLOGY  LEVEL 4 MEDICAL DETOX UNIT  Natalee Bardales 28 y o  male MRN: [id number]  Unit/Bed#: 5T DETOX 505-01 Encounter: [id number]      Reason for Admission/Principal Problem: Opioid withdrawal, opioid use disorder  Admitting Provider: Janee Flores PA-C  Attending Provider: David Crandall DO   5/27/2023  7:53 PM      * Opioid withdrawal ([facility] )  Assessment & Plan  · Admits last used fentanyl via insufflation between 10-11 AM on 05/27/2023  · Currently denying any withdrawal symptoms  · MAT using Suboxone was initiated  · Since last use less than 24 to 48 hours, Butrans patch 20 mcg/h was administered  · Consider initiating micro induction with Suboxone today 05/28/2023  · Continue symptomatic management    Opioid use disorder, severe, dependence ([facility] )  Assessment & Plan  · Admits began using fentanyl/heroin 2018  · Mode of consumption includes insufflation as well as intravenously  · Admits over the last 3 weeks, has been consuming 2-3 bags of fentanyl with concurrent cocaine via insufflation    · Last use between 10-11 AM 05/27/2023  · Admits last intravenous use was 1 week ago  · Was recently seen at [facility] rehab 12/2022 and was given a prescription for Suboxone which patient misplaced  · PDMP showed patient filled Suboxone prescription on 05/02 for 28dys  · Patient amendable to being placed back on Suboxone  ·  consulted for disposition planning: Requesting outpatient resources    Polysubstance abuse [facility])  Assessment & Plan  · Endorses chronic use of marijuana, amphetamine and cocaine  · Admits last snorted cocaine 05/27/2023  · UDS positive for amphetamine/meth, THC and cocaine  · Cessation counseling provided  · Continue symptomatic management    Hypomagnesemia  Assessment & Plan  · Initial lab: Magnesium 1 7  · Replenished with 2 g of magnesium IV  · Monitor a m  labs for improvement    Tobacco use disorder  Assessment & Plan  · Endorses half a pack per day cigarette use  · Cigarette use cessation counseling provided  · NRT ordered    Hepatitis C virus infection without hepatic coma  Assessment & Plan  · Admits was treated with [address] 2021  · Denies any abdominal pain, chills, nausea, vomiting, diarrhea, muscle ache, or weight loss  · Exam notable for no abdominal distention, fluid wave or tenderness on palpation  · Initial lab: LFT WNL  · Pending labs: Hepatitis panel; follow lab  · Recommend GI follow-up upon discharge          Additional medical treatment(s) includes: None       VTE Prophylaxis: Enoxaparin (Lovenox)  / sequential compression device   Code Status: Full code      Anticipated Length of Stay:  Patient will be admitted on an Inpatient basis with an anticipated length of stay of  2-3  midnights  Justification for Hospital Stay: Opioid withdrawal    For any questions or concerns, please Tiger Text the advanced practitioner in the role of [facility]OX-AP On Call      This patient qualifies for Level IV medically managed intensive inpatient services under the criteria set by the American Society of Addiction Medicine, including dimensions 1-3  The patient is in withdrawal (or is intoxicated with high risk of withdrawal), with severe and unstable medical and/or psychiatric (dual diagnosis) problems, requiring requires 24-hour medical and nursing care and the full resources of a [address] patient to medical detox unit and continue supportive care and stabilization of acute opioid withdrawal per medical toxicology/detox medication assisted treatment pathway       Complicated Opioid Withdrawal (ie associated with long acting agents, such as methadone or illicit fentanyl analogues):  • >72 hours: Routine COWS/induction as per uncomplicated opoid withdrawal (below)  • <72 hours:  • Adjunctive medications (see below), including clonazepam 1-2mg Q6 hrs PRN anxiety (or diazepam 5 mg if cannot take PO)  • >48 hours, test dose buprenorphine 0 5-1mg  • If responds well, continue with 2mg Q2 hrs (total 8mg) holding for worsening withdrawal, then start maintenance dosing   • If responds poorly, follow next step below   • <48 hours and/or COWS < 6 or failed test dose, add Butrans transdermal patch 20-40mcg/hr, monitor for signs/symptoms of withdrawal   • If within first 24-48 hrs, can administer buprenorphine 0 5-1mg Q6 hrs x 4, followed by 2mg Q2 hrs x 4 the next day  • If surpassing 48 hrs, can administer buprenorphine 2mg Q2hrs if tolerated without transdermal patch or lower sublingual dose  • When complete, remove transdermal patch and start maintenance dosing   • For moderate-severe withdrawal (COWS >/= 8, may still consider routine induction with buprenorphine 8 mg if appropriate  • For worsened or precipitated withdrawal, consider adjunctive benzodiazepines and other comfort meds  Dexmedetomidine may be considered for severe precipitated withdrawal          Uncomplicated Opioid Withdrawal:  Monitor opioid severity via Clinical Opioid Withdrawal Scale (COWS) Q4 hours and administer buprenorphine/naloxone 8mg/2mg when COWS >8, or when greater than 24 hours have elapsed from most recent opioid use (excluding long-acting opioids, such as methadone)  Continue to monitor opioid severity Q30-60 minutes after first dose and administer additional buprenorphine 2-4mg every 30-60 minutes until COWS < 8 for two consecutive hours                Adjunctive medications administered as needed:  Clonidine 0 1 mg PO Q6 hours PRN anxiety or palpitations    Gabapentin 300mg PO Q8 hours PRN anxiety    Ibuprofen 600 mg PO Q6 hours PRN pain    Acetaminophen 1000mg PO Q8 hours PRN pain    Ondansetron 4 mg PO Q6 hours PRN N/V    Nicotine patch 7, 14, 21 mg  PRN nicotine withdrawal   Trazodone 50 mg PO QHS PRN sleep    Loperamide 4 mg PO PRN diarrhea up to 16 mg/day       The risks, benefits and mechanism of buprenorphine/naloxone were discussed and patient agreed to treatment  Case management consultation will take place to assist with coordination of subsequent treatment after discharge  Administer daily multivitamin  Evaluate and treat for coexisting substance use, such as nicotine  Discuss risk factors for infectious disease, such as history of intravenous drug abuse, and offer hepatitis and HIV screening if indicated  Hx and PE limited by: None    HPI: Emely Devi is a 28y o  year old male with chronic hep C, polysubstance use disorder, opioid use disorder and tobacco use disorder who presented to the ED requesting detox for fentanyl use  Patient stated that he has been using both cocaine and fentanyl since 2018  Mindi Helms was recently discharged from [facility] and was given a prescription for Suboxone 8 mg twice daily which he filled on 05/02 for 28 days  Admits upon picking up the medication, displaced it and for the past 3 weeks, has been binging on cocaine/fentanyl  Admits consumes about 2-3 bags of fentanyl daily  Last known use via insufflation was between 10-11 AM 05/27/2023  Admits history of intravenous drug use, last intravenous use was about a week ago  Currently patient is amenable to being placed back on Suboxone  Currently patient is denying any withdrawal symptoms including tremors, abdominal pain, diarrhea, diaphoresis or any other complaint on review of systems  Patient is requesting outpatient rehab resources upon completion of his rehabilitation  Patient was seen in the ED and had work-up initiated with pertinent findings including magnesium 1 7 and urine drug screen positive for amphetamine/meth, THC and cocaine      Opioids currently used: heroin and fentanyl  Route of use: insufflation and intravenous  Date/Time of Last Opioid Use: 10-11am 05/27/2023  Current Signs/Symptoms of Opioid Withdrawal: Exhibiting no withdrawal symptoms "currently    COWS score:   Clinical Opiate Withdrawal Scale  Pulse: 70  Resting Pulse Rate: Measured After Patient is Sitting or Lying for One Minute: Pulse rate 80 or below  GI Upset: Over Last Half Hour: No GI symptoms  Sweating: Over Past Half Hour Not Accounted for by Room Temperature of Patient Activity: No report of chills or flushing  Tremor: Observation of Outstretched Hands: No tremor  Restlessness: Observation During Assessment: Able to sit still  Yawning: Observation During Assessment: No yawning  Pupil Size: Pupils pinned or normal size for room light  Anxiety and Irritability: None  Bone or Joint Aches: If Patient was Having Pain Previously, Only the Additional Component Attributed to Opiate Withdrawal is Scored: Not present  Gooseflesh Skin: Skin is smooth  Runny Nose or Tearing: Not Accounted for by Cold Symptoms or Allergies: Not present  Clinical Opiate Withdrawal Scale Total Score: 0      Methadone & Buprenorphine History  History of prior treatment for opioid dependence? no  Currently on Methadone Maintenance? no  History of prior treatment with Suboxone? yes  Currently taking Suboxone? no  History of using Suboxone without having a prescription? no  History of IVDA? yes  Co-existing substance use? yes    Review of PDMP: yes    Social History     Substance and Sexual Activity   Alcohol Use Yes    Comment: \"weekends\"     Social History     Substance and Sexual Activity   Drug Use Yes   • Types: Heroin, \"Crack\" cocaine    Comment: Heroin 2 bags per day     Social History     Tobacco Use   Smoking Status Every Day   • Packs/day: 0 25   • Years: 15 00   • Total pack years: 3 75   • Types: Cigarettes   Smokeless Tobacco Never       Review of Systems   Constitutional: Negative for appetite change, diaphoresis and fatigue  HENT: Negative for congestion, postnasal drip and rhinorrhea  Respiratory: Negative for choking, chest tightness and shortness of breath      Cardiovascular: Negative for chest " pain    Gastrointestinal: Negative for abdominal pain, constipation, diarrhea, nausea and vomiting  Genitourinary: Negative for dysuria  Musculoskeletal: Negative for arthralgias and myalgias  Neurological: Negative for dizziness, tremors, seizures, weakness, numbness and headaches  Psychiatric/Behavioral: Negative for agitation, sleep disturbance and suicidal ideas  The patient is not nervous/anxious          Historical Information   Past Medical History:   Diagnosis Date   • Hepatitis C      Past Surgical History:   Procedure Laterality Date   • APPENDECTOMY       Family History   Problem Relation Age of Onset   • Hepatitis Mother    • HIV Mother    • Heart attack Father      Social History   Marital Status: Single   Occupation: works in [employer]  Patient Pre-hospital Living Situation: alone  Patient Pre-hospital Level of Mobility: independent mobility  Patient Pre-hospital Diet Restrictions: None    No Known Allergies    Prior to Admission medications    Not on File       Current Facility-Administered Medications   Medication Dose Route Frequency   • acetaminophen (TYLENOL) tablet 650 mg  650 mg Oral Q6H PRN   • clonazePAM (KlonoPIN) tablet 1 mg  1 mg Oral Q6H PRN   • cloNIDine (CATAPRES) tablet 0 1 mg  0 1 mg Oral Q6H PRN   • enoxaparin (LOVENOX) subcutaneous injection 40 mg  40 mg Subcutaneous Daily   • multivitamin-minerals (CENTRUM) tablet 1 tablet  1 tablet Oral Daily   • nicotine (NICODERM CQ) 21 mg/24 hr TD 24 hr patch 1 patch  1 patch Transdermal Daily PRN   • nicotine (NICODERM CQ) 21 mg/24 hr TD 24 hr patch 21 mg  21 mg Transdermal Once   • ondansetron (ZOFRAN) injection 4 mg  4 mg Intravenous Q6H PRN   • sodium chloride 0 9 % infusion  100 mL/hr Intravenous Continuous   • transdermal buprenorphine (BUTRANS) 20 mcg/hr TD patch 20 mcg  20 mcg Transdermal Q7 Days   • traZODone (DESYREL) tablet 50 mg  50 mg Oral HS PRN       Continuous Infusions:sodium chloride, 100 mL/hr, Last Rate: 100 mL/hr (05/27/23 2244)             Objective     No intake or output data in the 24 hours ending 05/28/23 0321    Invasive Devices:   Peripheral IV 05/05/21 Left Hand (Active)       Vitals   Vitals:    05/27/23 1957 05/27/23 2232   BP: 140/80 148/84   TempSrc: Tympanic Temporal   Pulse: 84 70   Resp: 16 18   Patient Position - Orthostatic VS: Sitting Sitting   Temp: 98 1 °F (36 7 °C) 98 1 °F (36 7 °C)       Physical Exam  Vitals and nursing note reviewed  Constitutional:       General: He is not in acute distress  Appearance: He is not ill-appearing, toxic-appearing or diaphoretic  HENT:      Head: Normocephalic and atraumatic  Nose: No congestion or rhinorrhea  Eyes:      General: No scleral icterus  Extraocular Movements: Extraocular movements intact  Cardiovascular:      Rate and Rhythm: Normal rate and regular rhythm  Heart sounds: No murmur heard  Pulmonary:      Effort: Pulmonary effort is normal  No respiratory distress  Breath sounds: Normal breath sounds  No wheezing  Abdominal:      General: Bowel sounds are normal  There is no distension  Palpations: Abdomen is soft  Tenderness: There is no abdominal tenderness  Musculoskeletal:         General: No swelling or tenderness  Normal range of motion  Cervical back: Normal range of motion  No rigidity  Right lower leg: No edema  Left lower leg: No edema  Skin:     General: Skin is warm  Coloration: Skin is not jaundiced  Findings: No bruising or erythema  Neurological:      Mental Status: He is oriented to person, place, and time  GCS: GCS eye subscore is 4  GCS verbal subscore is 5  GCS motor subscore is 6  Cranial Nerves: Cranial nerves 2-12 are intact  Sensory: Sensation is intact  Motor: No tremor  Coordination: Finger-Nose-Finger Test normal    Psychiatric:         Attention and Perception: He does not perceive auditory or visual hallucinations           Thought "Content: Thought content is not paranoid or delusional  Thought content does not include homicidal or suicidal ideation  Thought content does not include homicidal or suicidal plan  DATA    EKG, Pathology, and Other Studies: I have personally reviewed pertinent reports  EKG: (05/27/2023; 2038): NSR Vent  Rate 63 bpm, IA interval 150ms, QRS duration 84ms, QT/QTc 378/386ms    Lab Results: I have personally reviewed pertinent reports          CBC ETOH     Lab Results   Component Value Date    HCT 41 2 05/27/2023    HGB 14 1 05/27/2023    MCH 32 0 05/27/2023    MCHC 34 2 05/27/2023    MCV 94 05/27/2023    MPV 10 0 05/27/2023     05/27/2023    RBC 4 40 05/27/2023    RDW 12 1 05/27/2023    WBC 5 66 05/27/2023      No results found for: \"LACTICACID\"   CMP UA         Component Value Date/Time    BUN 15 05/27/2023 2035     05/27/2023 2035    CO2 31 05/27/2023 2035    CREATININE 1 29 05/27/2023 2035    K 4 0 05/27/2023 2035         Component Value Date/Time    ALKPHOS 49 05/27/2023 2035    ALT 31 05/27/2023 2035    AST 19 05/27/2023 2035    CALCIUM 9 6 05/27/2023 2035      Lab Results   Component Value Date    BILIRUBINUR Negative 02/11/2020    BLOODU Negative 02/11/2020    CLARITYU Clear 02/11/2020    COLORU yellow 02/11/2020    COLORU Yellow 02/11/2020    GLUCOSEU Negative 02/11/2020    KETONESU Negative 02/11/2020    LEUKOCYTESUR Negative 02/11/2020    NITRITE Negative 02/11/2020    PHUR 5 5 02/11/2020    PROTEIN UA Negative 02/11/2020    SPECGRAV 1 010 02/11/2020    UROBILINOGEN 0 2 02/11/2020        Liver Function Test: ASA     Lab Results   Component Value Date    ALB 4 6 05/27/2023    ALKPHOS 49 05/27/2023    ALT 31 05/27/2023    AST 19 05/27/2023    TBILI 0 55 05/27/2023    TP 7 1 05/27/2023      Lab Results   Component Value Date    SALICYLATE <5 97/66/4610      Troponin APAP     No results found for: \"TROPONINI\"   Lab Results   Component Value Date    ACTMNPHEN <10 (L) 05/27/2023      VBG HCG " "    No results found for: \"BEVEN\", \"RER6SRS\", \"V8RWLGECZ\", \"Y9MWEAP\", \"AXS3KWY\", \"PHVEN\", \"PO2VEN\"   No results found for: \"HCGQUANT\"   ABG Urine Drug Screen     No results found for: \"ACRATE\", \"BRYNN\", \"BEART\", \"ISJ7ZEL\", \"INSPIREDAIR\", \"F3TCXQXPI\", \"O2HGB\", \"HGN7FHQ\", \"PEEP\", \"PHART\", \"PO2ART\", \"SOURC\", \"VTAC\"   Lab Results   Component Value Date    AMPMETHUR Positive (A) 05/27/2023    BARBTUR Negative 05/27/2023    BDZUR Negative 05/27/2023    COCAINEUR Positive (A) 05/27/2023    METHADONEUR Negative 05/27/2023    OPIATEUR Negative 05/27/2023    OXYCODONEUR Negative 05/27/2023    PCPUR Negative 05/27/2023    THCUR Positive (A) 05/27/2023      Lactate INR     No results found for: \"LACTICACID\"   Lab Results   Component Value Date    INR 1 02 05/27/2020      PTT Protime     No results found for: \"PTT\"   Lab Results   Component Value Date/Time    PROTIME 13 5 05/27/2020 11:04 AM      Hepatitis HIV     Lab Results   Component Value Date    HEPBSAG Non-reactive 01/31/2020    HEPCAB High Reactive (A) 01/31/2020      No results found for: \"YZO7L56EC\", \"CLSVJNP8SPA7\"         Imaging Studies: I have personally reviewed pertinent reports  Counseling / Coordination of Care  Total floor / unit time spent today 45 minutes  Greater than 50% of total time was spent with the patient and / or family counseling and / or coordination of care         Minutes of critical care time 39  -Critical care time was exclusive of separately billable procedures and teaching time    -Critical care was necessary to treat or prevent imminent or life-threatening deterioration of the following condition: CNS failure/compromise, toxidrome (opioid withdrawal), toxidrome and withdrawal  -Critical care time was spent personally by me on the following activities as well as the above as per the course and rest of chart: obtaining history from patient/surrogate, development of a treatment plan, discussions with referring provider(s), evaluation of " patient's response to the treatment, examination of the patient, performing  treatments and interventions, re-evaluation of the patient's condition, review of old charts, ordering/interpreting laboratory studies, ordering/interpreting of radiographic studies  ** Please Note: This note has been constructed using a voice recognition system   **

## 2023-05-28 NOTE — DISCHARGE INSTR - OTHER ORDERS
Drug and Alcohol Resources in [facility]    If you have health insurance, including medical assistance, there should be a phone number on your insurance card that you can call to find out how to access services  The card may say, “For [address] or “For Drug and Alcohol Services” or “For Substance Abuse Services” call the number provided  Or contact [phone]    [facility] ([facility])  The [facility] provides care management for adults with Opioid Use Disorder  The [facility] has a team of care managers who will help individuals get into treatment, coordinate behavioral and physical health care, and provide recovery support and guidance  Care managers will also provide information about Medication-Assisted Treatment  Contact [phone]    [facility] Drug and Alcohol Administration [phone] For additional information, contact the Department of Human Services Information and Referral Unit at [phone] between the hours of 8:30 a m  and 4:30 p m , Monday through Friday  Recovery Centers  These centers offer a safe, sober environment to those in recovery  A variety of programming including 12-Step Meetings, Oneita Hong, Life Skills Workshops, etc  is offered at each location  Recovery Centers  These centers offer a safe, sober environment to those in recovery  A variety of programming including 12-Step Meetings, Oneita Hong, Life Skills Workshops, etc  is offered at each location  [address]  [phone]  [email or web address]  org [employer]  Latasha Ortiz, [address] [facility]  [phone]  qyrguu-mu-luae    [address], [address] [facility]  [phone]   [address] [place] [address]  [phone]  www  [facility], [address] [place] [address]  [phone]  [facility]  JENNY BLAKE [facility] is [phone] [place]  Aashish Leeanne Chris, Thursday from 1pm-5pm and Friday, Saturday from 11am-3pm    [employer]  Confidential free help, from public health agencies, to find substance use treatment and information  [phone]    Link for Zoom Codes for Virtual 12 step Meetings PodSundark tn  aspx    [facility]  If you feel you have a problem with alcohol, or if you simply want to know more about AA, call our 24-hour hotline at: [address]: [phone]    [address] Meetings can be found at [email or web address] [email or web address] [email or web address]  HUMBERTO Meeting list [email or web address]    [facility] mental health resources      Crisis Intervention  24 hour Emergency [address] [employer])  Call [phone]  Outside of [facility]: call [phone] mental health: Information & Referral at [phone]  [facility]  [address], [place], 98 [facility] [phone] drop in center  [address]  [address] [place] [address] [address]  [phone]

## 2023-05-28 NOTE — ED PROVIDER NOTES
"History  Chief Complaint   Patient presents with   • Detox Evaluation     Pt reports that he was given a Rx for 112 Suboxone  Pt lost one box two weeks ago  For three weeks pat has been using 2 bags of fentanyl IV at first and now intranasal  Pt last used fentanyl and cocaine at 1200  Pt reports that he uses $20 of cocaine per day  Patient is a 60-year-old male coming in for evaluation of opiate abuse  Patient reportedly has been using every day for the last 3 weeks, including using what ever drug he is able to get his hands on  Patient story is confusing timewise wise, but states that he was given a prescription for 2 months of Suboxone, treat loss, before relapsing on drugs  Patient would like to be detoxed, and then in ducted on to Suboxone again  Patient's last use was several hours prior to arrival      Detox Evaluation  Associated symptoms: no abdominal pain, no confusion, no headaches, no nausea and no vomiting        None       Past Medical History:   Diagnosis Date   • Hepatitis C        History reviewed  No pertinent surgical history  Family History   Problem Relation Age of Onset   • Hepatitis Mother    • HIV Mother    • Heart attack Father      I have reviewed and agree with the history as documented  E-Cigarette/Vaping     E-Cigarette/Vaping Substances   • Nicotine No    • THC No    • CBD No    • Flavoring No    • Other No    • Unknown No      Social History     Tobacco Use   • Smoking status: Every Day     Packs/day: 0 25     Years: 15 00     Total pack years: 3 75     Types: Cigarettes   • Smokeless tobacco: Never   Substance Use Topics   • Alcohol use: Yes     Comment: \"weekends\"   • Drug use: Yes     Types: Heroin, \"Crack\" cocaine     Comment: Heroin 2 bags per day       Review of Systems   Constitutional: Negative for chills  Gastrointestinal: Negative for abdominal pain, nausea and vomiting  Neurological: Negative for headaches  Psychiatric/Behavioral: Negative for confusion   " Physical Exam  Physical Exam  Vitals reviewed  Constitutional:       Appearance: Normal appearance  He is normal weight  HENT:      Head: Normocephalic and atraumatic  Right Ear: External ear normal       Left Ear: External ear normal       Nose: Nose normal    Eyes:      Conjunctiva/sclera: Conjunctivae normal    Cardiovascular:      Rate and Rhythm: Normal rate  Pulmonary:      Effort: Pulmonary effort is normal    Musculoskeletal:         General: Normal range of motion  Cervical back: Normal range of motion  Skin:     General: Skin is warm and dry  Neurological:      Mental Status: He is alert           Vital Signs  ED Triage Vitals [05/27/23 1957]   Temperature Pulse Respirations Blood Pressure SpO2   98 1 °F (36 7 °C) 84 16 140/80 100 %      Temp Source Heart Rate Source Patient Position - Orthostatic VS BP Location FiO2 (%)   Tympanic Monitor Sitting Left arm --      Pain Score       --           Vitals:    05/27/23 1957   BP: 140/80   Pulse: 84   Patient Position - Orthostatic VS: Sitting         Visual Acuity      ED Medications  Medications   nicotine (NICODERM CQ) 21 mg/24 hr TD 24 hr patch 21 mg (21 mg Transdermal Medication Applied 5/27/23 2036)       Diagnostic Studies  Results Reviewed     Procedure Component Value Units Date/Time    Comprehensive metabolic panel [[id number]] Collected: 05/27/23 2035    Lab Status: Final result Specimen: Blood from Arm, Left Updated: 05/27/23 2112     Sodium 139 mmol/L      Potassium 4 0 mmol/L      Chloride 101 mmol/L      CO2 31 mmol/L      ANION GAP 7 mmol/L      BUN 15 mg/dL      Creatinine 1 29 mg/dL      Glucose 89 mg/dL      Calcium 9 6 mg/dL      AST 19 U/L      ALT 31 U/L      Alkaline Phosphatase 49 U/L      Total Protein 7 1 g/dL      Albumin 4 6 g/dL      Total Bilirubin 0 55 mg/dL      eGFR 72 ml/min/1 73sq m     Narrative:      [place] guidelines for Chronic Kidney Disease (CKD):   •  Stage 1 with normal or high GFR (GFR > 90 mL/min/1 73 square meters)  •  Stage 2 Mild CKD (GFR = 60-89 mL/min/1 73 square meters)  •  Stage 3A Moderate CKD (GFR = 45-59 mL/min/1 73 square meters)  •  Stage 3B Moderate CKD (GFR = 30-44 mL/min/1 73 square meters)  •  Stage 4 Severe CKD (GFR = 15-29 mL/min/1 73 square meters)  •  Stage 5 End Stage CKD (GFR <15 mL/min/1 73 square meters)  Note: GFR calculation is accurate only with a steady state creatinine    Magnesium [[id number]]  (Abnormal) Collected: 05/27/23 2035    Lab Status: Final result Specimen: Blood from Arm, Left Updated: 05/27/23 2112     Magnesium 1 7 mg/dL     Salicylate level [[id number]]  (Normal) Collected: 05/27/23 2035    Lab Status: Final result Specimen: Blood from Arm, Left Updated: 42/79/59 3658     Salicylate Lvl <5 mg/dL     Acetaminophen level-If concentration is detectable, please discuss with medical  on call  [[id number]]  (Abnormal) Collected: 05/27/23 2035    Lab Status: Final result Specimen: Blood from Arm, Left Updated: 05/27/23 2112     Acetaminophen Level <10 ug/mL     Rapid drug screen, urine [[id number]]  (Abnormal) Collected: 05/27/23 2051    Lab Status: Final result Specimen: Urine, Clean Catch Updated: 05/27/23 2111     Amph/Meth UR Positive     Barbiturate Ur Negative     Benzodiazepine Urine Negative     Cocaine Urine Positive     Methadone Urine Negative     Opiate Urine Negative     PCP Ur Negative     THC Urine Positive     Oxycodone Urine Negative    Narrative:      Presumptive report  If requested, specimen will be sent to reference lab for confirmation  FOR MEDICAL PURPOSES ONLY  IF CONFIRMATION NEEDED PLEASE CONTACT THE LAB WITHIN 5 DAYS      Drug Screen Cutoff Levels:  AMPHETAMINE/METHAMPHETAMINES  1000 ng/mL  BARBITURATES     200 ng/mL  BENZODIAZEPINES     200 ng/mL  COCAINE      300 ng/mL  METHADONE      300 ng/mL  OPIATES      300 ng/mL  PHENCYCLIDINE     25 ng/mL  THC       50 ng/mL  OXYCODONE      100 ng/mL    Ethanol [[id number]]  (Normal) Collected: 05/27/23 2035    Lab Status: Final result Specimen: Blood from Arm, Left Updated: 05/27/23 2107     Ethanol Lvl <10 mg/dL     CBC and differential [[id number]] Collected: 05/27/23 2035    Lab Status: Final result Specimen: Blood from Arm, Left Updated: 05/27/23 2045     WBC 5 66 Thousand/uL      RBC 4 40 Million/uL      Hemoglobin 14 1 g/dL      Hematocrit 41 2 %      MCV 94 fL      MCH 32 0 pg      MCHC 34 2 g/dL      RDW 12 1 %      MPV 10 0 fL      Platelets 716 Thousands/uL      nRBC 0 /100 WBCs      Neutrophils Relative 56 %      Immat GRANS % 0 %      Lymphocytes Relative 33 %      Monocytes Relative 9 %      Eosinophils Relative 1 %      Basophils Relative 1 %      Neutrophils Absolute 3 19 Thousands/µL      Immature Grans Absolute 0 02 Thousand/uL      Lymphocytes Absolute 1 86 Thousands/µL      Monocytes Absolute 0 50 Thousand/µL      Eosinophils Absolute 0 05 Thousand/µL      Basophils Absolute 0 04 Thousands/µL                  No orders to display              Procedures  Procedures         ED Course                                             Medical Decision Making  Patient is a 59-year-old male coming in for evaluation for opiate abuse  Patient reportedly stopped taking Suboxone proxy month ago after he lost his Suboxone, started using opiates approximate 3 ago  Would like detox, and would like to be on Suboxone  Patient admitted for Suboxone induction    Amount and/or Complexity of Data Reviewed  Labs: ordered  Risk  OTC drugs  Decision regarding hospitalization            Disposition  Final diagnoses:   Polysubstance abuse ([facility] [address] )     Time reflects when diagnosis was documented in both MDM as applicable and the Disposition within this note     Time User Action Codes Description Comment    5/27/2023  9:39 PM Isaura Fox Add [F19 10] Polysubstance abuse [facility])       ED Disposition     ED Disposition   Admit    Condition   Stable    Date/Time   Sat May 27, 2023 9:39 PM    Comment   Case was discussed with Destin Chu and the patient's admission status was agreed to be Admission Status: inpatient status to the service of Dr Cassandra Bhat   Follow-up Information     Follow up With Specialties Details Why Contact Info Additional Information    [address]   [address] [id number]  [phone]       [facility] Emergency Department Emergency Medicine  As needed, If symptoms worsen [address] [address]  [facility] Heart Emergency Department          Patient's Medications    No medications on file       No discharge procedures on file      PDMP Review     None          ED Provider  Electronically Signed by           Hussain Martinez PA-C  05/27/23 3169

## 2023-05-28 NOTE — ASSESSMENT & PLAN NOTE
· Admits last used fentanyl via insufflation between 10-11 AM on 05/27/2023  · Currently denying any withdrawal symptoms  · Patient tolerated micro-induction last evening  Will begin maintenance dosing this morning  Upon assessment patient continues to report withdrawal symptoms of myalgias and chills  Will continue to monitor today and provide supportive care as needed

## 2023-05-28 NOTE — ASSESSMENT & PLAN NOTE
· Admits began using fentanyl/heroin 2018  · Mode of consumption includes insufflation as well as intravenously  · Admits over the last 3 weeks, has been consuming 2-3 bags of fentanyl with concurrent cocaine via insufflation    · Last use between 10-11 AM 05/27/2023  · Admits last intravenous use was 1 week ago  · Was recently seen at [facility] rehab 12/2022 and was given a prescription for Suboxone which patient misplaced  · PDMP showed patient filled Suboxone prescription on 05/02 for 28dys  · Patient amendable to being placed back on Suboxone  ·  consulted for disposition planning: Requesting outpatient resources

## 2023-05-28 NOTE — ASSESSMENT & PLAN NOTE
· Initial lab: Magnesium 1 7  · Replenished with 2 g of magnesium IV  · Monitor a m  labs for improvement

## 2023-05-28 NOTE — ASSESSMENT & PLAN NOTE
· Admits was treated with Mavyret 2021  · Denies any abdominal pain, chills, nausea, vomiting, diarrhea, muscle ache, or weight loss  · Exam notable for no abdominal distention, fluid wave or tenderness on palpation  · Initial lab: LFT WNL  · Pending labs: Hepatitis panel  · Recommend GI follow-up upon discharge

## 2023-05-28 NOTE — PROGRESS NOTES
"   23 1129   Referral Data   Referral Source Patient   Referral Name Self   Referral Reason Drug/Alcohol [place] [address]   Readmission Root Cause   30 Day Readmission No   Patient Information   Mental Status Alert   Primary Caregiver Self   Accompanied by/Relationship Self   Support System Immediate family   Legal Information   Legal Issues Pt denies   Activities of Daily Living Prior to Admission   Functional Status Independent   Assistive Device No device needed   Living Arrangement Apartment;Lives alone   Ambulation Independent   Access to Firearms   Access to Firearms No  (pt denies)   Income Information   Income Source Unemployed   [place] [employer] of PLDT   [place] App[employer] of Transport to [facility]: [address]     Pt's name, , address, phone number were verified by case management  Pt was informed of case management role and the purpose of completion of intake with case management  Pt reports significant withdrawal symptoms at this time but was agreeable to completing intake with CM; pt was cooperative but rajiv with answers to questions  Pt reports that he has been using approximately 2-3 bags of fentanyl daily for the last 3 weeks via insufflation; first use at age 32 and last use on 2023  Pt reports an unknown daily amount of cocaine use via insufflation; first use at age 12 and last use on 2023  Pt reports use via IV as well of both substances  Pt reports occasional THC use; first use at age 15 and last use on 2023  Pt adamantly denies Amphetamine or Methamphetamine use despite testing positive on most recent UDS  Pt reports longest period of sobriety as 1 5 years; when asked what helped him to maintain abstinence, he stated \"not living in [facility]"  When asked what withdrawal symptoms pt is currently experiencing, he stated \"everything\"   Pt appears to be restless and anxious, rolling back and forth in his bed covering his face with his " "hands  Pt reports most recent rehab at [facility] in December 2022; pt reports that he followed up with [facility] for MAT after rehab  Pt reports family history of polysubstance use  AUDIT: No score   PAWSS: 2  UDS (+) for: Amph/Meth, Cocaine, THC  Ethanol level in ED: Not completed    Pt denies current or previous mental health treatment; denies SI/HI; denies AH/VH; denies access to firearms  When asked about history of trauma or abuse, pt states \"I guess, montserrat\" but declined to elaborate further on this  Pt states that family history of mental health diagnoses is unknown  Pt has Hep C; pt verified PCP as Dr Yeny Red through [address] in [place], declined to sign DEV for PCP  Pt verified insurance as [employer] and signed ROIs  Pt verified preferred pharmacy as [facility] in [facility]  Pt denies any legal issues  Pt reports that he completed his GED; states that he is currently unemployed  Pt denies receiving any assistance through SSI/SSD  Pt reports that he uses the bus for his main mode of transportation  Pt denies  service  Pt reports that he lives alone in an apartment; pt declined to sign ROIs for supportive contacts  Pt declined to participate in relapse prevention plan at this time due to experiencing significant withdrawal symptoms  Pt indicates desire to follow up with [facility] for MAT, signed DEV; pt not interested in any inpatient or intensive outpatient substance use treatment at this time  CM's clinical impression is that pt may benefit from further education on treatment options once he is more stable from a withdrawal management perspective  Pt may be willing to have further discussion around this at that time  Pt presents in the contemplation stage of change       "

## 2023-05-29 PROBLEM — E83.42 HYPOMAGNESEMIA: Status: RESOLVED | Noted: 2023-05-28 | Resolved: 2023-05-29

## 2023-05-29 LAB
ANION GAP SERPL CALCULATED.3IONS-SCNC: 7 MMOL/L (ref 4–13)
BUN SERPL-MCNC: 13 MG/DL (ref 5–25)
CALCIUM SERPL-MCNC: 9.1 MG/DL (ref 8.4–10.2)
CHLORIDE SERPL-SCNC: 106 MMOL/L (ref 96–108)
CO2 SERPL-SCNC: 27 MMOL/L (ref 21–32)
CREAT SERPL-MCNC: 1.08 MG/DL (ref 0.6–1.3)
GFR SERPL CREATININE-BSD FRML MDRD: 90 ML/MIN/1.73SQ M
GLUCOSE SERPL-MCNC: 107 MG/DL (ref 65–140)
HAV IGM SER QL: ABNORMAL
HBV CORE IGM SER QL: ABNORMAL
HBV SURFACE AG SER QL: ABNORMAL
HCV AB SER QL: REACTIVE
POTASSIUM SERPL-SCNC: 3.8 MMOL/L (ref 3.5–5.3)
SODIUM SERPL-SCNC: 140 MMOL/L (ref 135–147)

## 2023-05-29 PROCEDURE — 99232 SBSQ HOSP IP/OBS MODERATE 35: CPT

## 2023-05-29 PROCEDURE — 80048 BASIC METABOLIC PNL TOTAL CA: CPT | Performed by: NURSE PRACTITIONER

## 2023-05-29 RX ORDER — BUPRENORPHINE AND NALOXONE 8; 2 MG/1; MG/1
8 FILM, SOLUBLE BUCCAL; SUBLINGUAL 2 TIMES DAILY
Status: DISCONTINUED | OUTPATIENT
Start: 2023-05-29 | End: 2023-05-30

## 2023-05-29 RX ADMIN — BUPRENORPHINE AND NALOXONE 8 MG: 8; 2 FILM, SOLUBLE BUCCAL; SUBLINGUAL at 17:45

## 2023-05-29 RX ADMIN — CLONAZEPAM 1 MG: 0.5 TABLET ORAL at 21:02

## 2023-05-29 RX ADMIN — ACETAMINOPHEN 650 MG: 325 TABLET ORAL at 19:44

## 2023-05-29 RX ADMIN — ACETAMINOPHEN 650 MG: 325 TABLET ORAL at 09:05

## 2023-05-29 RX ADMIN — CLONAZEPAM 1 MG: 0.5 TABLET ORAL at 15:08

## 2023-05-29 RX ADMIN — TRAZODONE HYDROCHLORIDE 50 MG: 50 TABLET ORAL at 21:02

## 2023-05-29 RX ADMIN — MULTIPLE VITAMINS W/ MINERALS TAB 1 TABLET: TAB ORAL at 08:46

## 2023-05-29 RX ADMIN — NICOTINE 1 PATCH: 21 PATCH, EXTENDED RELEASE TRANSDERMAL at 08:46

## 2023-05-29 RX ADMIN — CLONAZEPAM 1 MG: 0.5 TABLET ORAL at 09:05

## 2023-05-29 RX ADMIN — BUPRENORPHINE AND NALOXONE 8 MG: 8; 2 FILM, SOLUBLE BUCCAL; SUBLINGUAL at 08:46

## 2023-05-29 NOTE — ASSESSMENT & PLAN NOTE
· Admits was treated with Mavyret 2021  · Denies any abdominal pain, chills, nausea, vomiting, diarrhea, muscle ache, or weight loss  · Exam notable for no abdominal distention, fluid wave or tenderness on palpation  · Initial lab: LFT WNL  · Pending labs: Hepatitis C antibody - reactive  · Recommend GI follow-up upon discharge

## 2023-05-29 NOTE — PROGRESS NOTES
Progress Note - Medical Toxicology    Yusra Smith 28 y o  male MRN: [id number]  Unit/Bed#: 5T DETOX 505-01 Encounter: [id number]  MEDICAL DETOX UNIT, LEVEL 4  Department of Medical Toxicology  Reason for Admission/Principal Problem: opioid withdrawal   Rounding Provider: Rukhsana Thompson PA-C, Maame Chi MD         * Opioid withdrawal [facility])  Assessment & Plan  · Bryce Juneamer last used fentanyl via insufflation between 10-11 AM on 05/27/2023  · Currently denying any withdrawal symptoms  · Patient tolerated micro-induction last evening  Will begin maintenance dosing this morning  Upon assessment patient continues to report withdrawal symptoms of myalgias and chills  Will continue to monitor today and provide supportive care as needed  Opioid use disorder, severe, dependence ([facility] [address] )  Assessment & Plan  · Admits began using fentanyl/heroin 2018  · Mode of consumption includes insufflation as well as intravenously  · Admits over the last 3 weeks, has been consuming 2-3 bags of fentanyl with concurrent cocaine via insufflation    · Last use between 10-11 AM 05/27/2023  · Admits last intravenous use was 1 week ago  · Was recently seen at [facility] rehab 12/2022 and was given a prescription for Suboxone which patient misplaced  · PDMP showed patient filled Suboxone prescription on 05/02 for 28dys  · Patient amendable to being placed back on Suboxone  ·  consulted for disposition planning: Requesting outpatient resources    Polysubstance abuse [facility])  Assessment & Plan  · Endorses chronic use of marijuana, amphetamine and cocaine  · Admits last snorted cocaine 05/27/2023  · UDS positive for amphetamine/meth, THC and cocaine  · Cessation counseling provided  · Continue symptomatic management    Tobacco use disorder  Assessment & Plan  · Endorses half a pack per day cigarette use  · Cigarette use cessation counseling provided  · NRT ordered    Hepatitis C virus infection without hepatic coma  Assessment & Plan  · Admits was treated with Mavyret 2021  · Denies any abdominal pain, chills, nausea, vomiting, diarrhea, muscle ache, or weight loss  · Exam notable for no abdominal distention, fluid wave or tenderness on palpation  · Initial lab: LFT WNL  · Pending labs: Hepatitis panel  · Recommend GI follow-up upon discharge    Hypomagnesemia-resolved as of 5/29/2023  Assessment & Plan  · Initial lab: Magnesium 1 7  · Replenished with 2 g of magnesium IV  · Monitor a m  labs for improvement          VTE Pharmacologic Prophylaxis:   Pharmacologic: Enoxaparin (Lovenox)  Mechanical VTE Prophylaxis in Place: yes    Code Status: Level 1 - Full Code    Patient Centered Rounds: I have performed bedside rounds with nursing staff today  Discussions with Specialists or Other Care Team Provider: Case Management   Education and Discussions with Family / Patient: I personally answered all of the patient questions to the best of my ability     Time Spent for Care: 30 minutes  More than 50% of total time spent on counseling and coordination of care as described above  Current Length of Stay: 2 day(s)    Current Patient Status: Inpatient     Certification Statement: The patient will continue to require additional inpatient hospital stay due to opioid withdrawal, stabilizing on maintenance dosing  Discharge Plan: Anticipated Discharge within 24 hours         Subjective:   Seen and examined at bedside  Patient states he continues to feel like he is still withdrawing as he is endorsing myalgias, stomach upset, and chills  He is scheduled for suboxone this morning  Discussed with patient we can continue to monitor and he may need additional suboxone throughout the day  Patient agreeable to continued to be monitored in inpatient setting and will follow up with outpatient MAT upon discharge       Objective:     Clinical Opiate Withdrawal Scale  Pulse: 55  Resting Pulse Rate: Measured After Patient is Sitting or Lying for One Minute: Pulse rate 80 or below  GI Upset: Over Last Half Hour: No GI symptoms  Sweating: Over Past Half Hour Not Accounted for by Room Temperature of Patient Activity: No report of chills or flushing  Tremor: Observation of Outstretched Hands: No tremor  Restlessness: Observation During Assessment: Able to sit still  Yawning: Observation During Assessment: No yawning  Pupil Size: Pupils pinned or normal size for room light  Anxiety and Irritability: None  Bone or Joint Aches: If Patient was Having Pain Previously, Only the Additional Component Attributed to Opiate Withdrawal is Scored: Not present  Gooseflesh Skin: Skin is smooth  Runny Nose or Tearing: Not Accounted for by Cold Symptoms or Allergies: Not present  Clinical Opiate Withdrawal Scale Total Score: 0    No data recorded      Last 24 Hours Medication List:   Current Facility-Administered Medications   Medication Dose Route Frequency Provider Last Rate   • acetaminophen  650 mg Oral Q6H PRN Obanais Johnson PA-C     • buprenorphine-naloxone  8 mg Sublingual BID Patricia Johnson PA-C     • clonazePAM  1 mg Oral Q6H PRN anais Johnson PA-C     • cloNIDine  0 1 mg Oral Q6H [facility]N [facility]     • enoxaparin  40 mg Subcutaneous Daily Patricia Johnson PA-C     • multivitamin-minerals  1 tablet Oral Daily anais Johnson PA-C     • nicotine  1 patch Transdermal Daily NINA German     • ondansetron  4 mg Intravenous Q6H PRN anais Johnson PA-C     • transdermal buprenorphine  20 mcg Transdermal Q7 Days anais Johnson PA-C     • traZODone  50 mg Oral HS PRN Obioma Samuel Johnson PA-C           Vitals:   Temp (24hrs), Av 4 °F (36 3 °C), Min:96 7 °F (35 9 °C), Max:98 °F (36 7 °C)    Temp:  [96 7 °F (35 9 °C)-98 °F (36 7 °C)] 97 6 °F (36 4 °C)  HR:  [55-68] 55  Resp:  [16-18] 16  BP: (121-137)/(72-88) 121/78  SpO2:  [98 %-100 %] 100 %  Body mass index is 26 61 kg/m²       Input and Output Summary (last 24 hours): Intake/Output Summary (Last 24 hours) at 5/29/2023 1038  Last data filed at 5/28/2023 1101  Gross per 24 hour   Intake 1228 33 ml   Output 2000 ml   Net -771 67 ml       Physical Exam:   Physical Exam  Constitutional:       General: He is not in acute distress  Appearance: He is diaphoretic  Eyes:      General: No scleral icterus  Pupils: Pupils are equal, round, and reactive to light  Cardiovascular:      Rate and Rhythm: Normal rate and regular rhythm  Heart sounds: No murmur heard  Pulmonary:      Effort: No respiratory distress  Breath sounds: Normal breath sounds  No wheezing  Abdominal:      Tenderness: There is generalized abdominal tenderness  Neurological:      Mental Status: He is oriented to person, place, and time  Psychiatric:         Mood and Affect: Mood is anxious  Mood is not depressed  Additional Data:     Labs: keep all most recent labs as listed on admission templates   Results from last 7 days   Lab Units 05/28/23  0510 05/27/23 2035   EOS PCT %  --  1   HEMATOCRIT % 37 3 41 2   HEMOGLOBIN g/dL 13 0 14 1   LYMPHS PCT %  --  33   MONOS PCT %  --  9   NEUTROS PCT %  --  56   PLATELETS Thousands/uL 216 252   WBC Thousand/uL 5 25 5 66      Results from last 7 days   Lab Units 05/29/23  0508 05/28/23  0510   ANION GAP mmol/L 7 6   ALBUMIN g/dL  --  3 7   ALK PHOS U/L  --  41   ALT U/L  --  27   AST U/L  --  20   BUN mg/dL 13 14   CALCIUM mg/dL 9 1 8 9   CHLORIDE mmol/L 106 105   CO2 mmol/L 27 29   CREATININE mg/dL 1 08 1 08   GLUCOSE RANDOM mg/dL 107 98   POTASSIUM mmol/L 3 8 3 7   SODIUM mmol/L 140 140   TOTAL BILIRUBIN mg/dL  --  0 27                              * I Have Reviewed All Lab Data Listed Above  * Additional Pertinent Lab Tests Reviewed: [address] Admission Reviewed      Imaging Studies: I have personally reviewed pertinent reports  Recent Cultures (last 7 days):           Today, Patient Was Seen By: Yashira Del Toro PA-C    ** Please Note: Dictation voice to text software may have been used in the creation of this document   **

## 2023-05-29 NOTE — DISCHARGE SUMMARY
[facility]  Discharge- Alvaro Brito 1990, 28 y o  male MRN: [id number]  Unit/Bed#: 5T DETOX 505-01 Encounter: [id number]  Primary Care Provider: Lupe Love DO   Date and time admitted to hospital: 5/27/2023  7:53 PM    [facility], LEVEL 4  Department of Medical Toxicology  Reason for Admission/Principal Problem: opioid withdrawal   Admitting provider: KATHY Simpson MD   5/27/2023  7:53 PM       Discharging Physician / Practitioner: Sam Carrillo PA-C  PCP: Lupe Love DO  Admission Date:   Admission Orders (From admission, onward)     Ordered        05/27/23 2139  INPATIENT ADMISSION  Once                      Discharge Date: 05/30/23    Medical Problems     Resolved Problems  Date Reviewed: 5/28/2023          Resolved    Hypomagnesemia 5/29/2023     Resolved by  Sam Carrillo PA-C    * (Principal) Opioid withdrawal ([facility] [address] ) 5/30/2023     Resolved by  Sam Carrillo PA-C          * Opioid withdrawal (HCC)-resolved as of 5/30/2023  Assessment & Plan  · Admits last used fentanyl via insufflation between 10-11 AM on 05/27/2023  · Currently heather any withdrawal symptoms  · Patient tolerated micro-induction 5/28/23- transitioned to maintenance dosing  Received Sublocade Injection 300 mg upon discharge  Opioid use disorder, severe, dependence ([facility] [address] )  Assessment & Plan  · Admits began using fentanyl/heroin 2018  · Mode of consumption includes insufflation as well as intravenously  · Admits over the last 3 weeks, has been consuming 2-3 bags of fentanyl with concurrent cocaine via insufflation    · Last use between 10-11 AM 05/27/2023  · Admits last intravenous use was 1 week ago  · Was recently seen at [facility] rehab 12/2022 and was given a prescription for Suboxone which patient misplaced  · PDMP showed patient filled Suboxone prescription on 05/02 for 28dys  · Patient amendable to being placed back on Suboxone, requesting Sublocade upon discharge  · Case management consulted for after-care planning  Patient will follow up with [facility]  Polysubstance abuse ([address] )  Assessment & Plan  · Endorses chronic use of marijuana, amphetamine and cocaine  · Admits last snorted cocaine 05/27/2023  · UDS positive for amphetamine/meth, THC and cocaine  · Cessation counseling provided  · Continue symptomatic management    Tobacco use disorder  Assessment & Plan  · Endorses half a pack per day cigarette use  · Cigarette use cessation counseling provided  · NRT ordered    Hepatitis C virus infection without hepatic coma  Assessment & Plan  · Admits was treated with Mavyret 2021  · Denies any abdominal pain, chills, nausea, vomiting, diarrhea, muscle ache, or weight loss  · Exam notable for no abdominal distention, fluid wave or tenderness on palpation  · Initial lab: LFT WNL  · Pending labs: Hepatitis C antibody - reactive  · Recommend GI follow-up upon discharge      Consultations During Hospital Stay:  · None    Procedures Performed:   · None    Significant Findings / Test Results:   · Hypomagnesmia- resolved    Incidental Findings:   · None     Test Results Pending at Discharge (will require follow up): · None     Outpatient Tests Requested:  · Follow up with [facility] for MAT     Complications:  None     Reason for Admission: opioid withdrawal     Hospital Course:     Laura Avendaño is a 28 y o  male patient who originally presented to the hospital on 5/27/2023 due to acute opioid withdrawal  Patient initially presented to the [address] [place] [address] ED requesting detoxification from opioids and agreed to initiation of Suboxone  Pt was subsequently admitted to the [address] [place] [address] Detox Unit for medically assisted opioid withdrawal and Suboxone microinduction    On admission, he was placed on Butrans patch 20 mcg to slowly introduce Suboxone and prevent precipitated withdrawal   On 5/28/23, patient underwent microinduction with 0 5 mg of buprenorphine followed by Suboxone 2 mg Q2H x4 doses for a total of 8 mg Suboxone during induction, which he tolerated well  Pt was then stabilized on maintenance Suboxone 8 mg BID  without complication, and Butrans patch subsequently removed  Received Sublocade Injection on 5/30/23  Case management was consulted for assistance with aftercare resources, and patient will be discharged with outpatient MAT upon discharge  Please see above list of diagnoses and related plan for additional information  Condition at Discharge: stable     Discharge Day Visit / Exam:     Subjective:  Patient seen and examined at bedside  Patient denies further withdrawal symptoms  He is requesting Sublocade Injection as he has previously been on sublocade  Will follow up with [facility] upon discharge  Vitals: Blood Pressure: 114/56 (05/30/23 0705)  Pulse: 60 (05/30/23 0705)  Temperature: 98 °F (36 7 °C) (05/30/23 0705)  Temp Source: Temporal (05/30/23 0705)  Respirations: 18 (05/30/23 0705)  Height: 6' (182 9 cm) (05/27/23 2232)  Weight - Scale: 89 kg (196 lb 3 4 oz) (05/27/23 2232)  SpO2: 98 % (05/30/23 0705)  Exam:   Physical Exam  Constitutional:       Appearance: He is not ill-appearing  Eyes:      General: No scleral icterus  Pupils: Pupils are equal, round, and reactive to light  Cardiovascular:      Rate and Rhythm: Normal rate and regular rhythm  Heart sounds: No murmur heard  Pulmonary:      Effort: No respiratory distress  Breath sounds: Normal breath sounds  No wheezing  Abdominal:      General: Bowel sounds are normal  There is no distension  Palpations: Abdomen is soft  Neurological:      Mental Status: He is alert and oriented to person, place, and time  Psychiatric:         Mood and Affect: Mood is not anxious or depressed           Discussion with Family: Discussed with patient     Discharge instructions/Information to patient and family:   See after visit summary for information provided to patient and family  Provisions for Follow-Up Care:  See after visit summary for information related to follow-up care and any pertinent home health orders  Disposition:     Home    For Discharges to [facility] SNF:   · Not Applicable to this Patient - Not Applicable to this Patient    Planned Readmission: none      Discharge Statement:  I spent 40 minutes discharging the patient  This time was spent on the day of discharge  I had direct contact with the patient on the day of discharge  Greater than 50% of the total time was spent examining patient, answering all patient questions, arranging and discussing plan of care with patient as well as directly providing post-discharge instructions  Additional time then spent on discharge activities  Discharge Medications:  See after visit summary for reconciled discharge medications provided to patient and family        ** Please Note: This note has been constructed using a voice recognition system **

## 2023-05-29 NOTE — UTILIZATION REVIEW
Initial Clinical Review    Pt presented to [address] [place] [address] for its Level IV medically managed intensive inpatient detox unit  Admission: Date/Time/Statement:   Admission Orders (From admission, onward)     Ordered        05/27/23 2139  INPATIENT ADMISSION  Once                      Orders Placed This Encounter   Procedures   • INPATIENT ADMISSION     Standing Status:   Standing     Number of Occurrences:   1     Order Specific Question:   Level of Care     Answer:   Med Surg [16]     Order Specific Question:   Estimated length of stay     Answer:   More than 2 Midnights     Order Specific Question:   Certification     Answer:   I certify that inpatient services are medically necessary for this patient for a duration of greater than two midnights  See H&P and MD Progress Notes for additional information about the patient's course of treatment  ED Arrival Information     Expected   -    Arrival   5/27/2023 19:50    Acuity   Emergent            Means of arrival   Walk-In    Escorted by   [facility] Toxicology    Admission type   Emergency            Arrival complaint   detox eval            Chief Complaint   Patient presents with   • Detox Evaluation     Pt reports that he was given a Rx for 112 Suboxone  Pt lost one box two weeks ago  For three weeks pat has been using 2 bags of fentanyl IV at first and now intranasal  Pt last used fentanyl and cocaine at 1200  Pt reports that he uses $20 of cocaine per day  Initial Presentation: 28 y o  male who presented to medical detox  Inpatient admission for evaluation and treatment of acute opiate withdrawal  Presented w/ request for detox from Fentanyl, heroin, cocaine  Uses 2-3 bags fentanyl daily, last use 5/27 @ 1000  On exam, no with drawal symptoms   UDS positive for amphetamines, cocaine, thc  COWS 0  Plan: COWS monitoring w/ symptomatic supportive care, Butrans patch, IVF, micro induction of Suboxone when clinically indicated, telemetry, continuous pulse ox, Trend labs, replete electrolytes as needed  Continue PTA meds  GCs 15  Had been prior script for suboxone, but relapsed after last treatment stay  Start suboxone  Date: 5/28 Day 2: Pt reports feeling anxious  On exam, anxious, cooperative    COWS 0  Plan: continue COWS monitoring w/ symptomatic supportive care, Butrans patch, micro induction of Suboxone, telemetry, continuous pulse ox, Trend labs, replete electrolytes as needed  Continue PTA meds      ED Triage Vitals   Temperature Pulse Respirations Blood Pressure SpO2   05/27/23 1957 05/27/23 1957 05/27/23 1957 05/27/23 1957 05/27/23 1957   98 1 °F (36 7 °C) 84 16 140/80 100 %      Temp Source Heart Rate Source Patient Position - Orthostatic VS BP Location FiO2 (%)   05/27/23 1957 05/27/23 1957 05/27/23 1957 05/27/23 1957 --   Tympanic Monitor Sitting Left arm       Pain Score       05/27/23 2232       No Pain          Wt Readings from Last 1 Encounters:   05/27/23 89 kg (196 lb 3 4 oz)     Additional Vital Signs:   Time Temp Pulse Resp BP MAP (mmHg) SpO2 O2 Device Patient Position - Orthostatic VS   05/28/23 2211 96 7 °F (35 9 °C) Abnormal  56 18 131/72 91 100 % None (Room air) Lying   05/28/23 1512 98 °F (36 7 °C) 68 18 125/88 100 98 % None (Room air) Sitting   05/28/23 1144 97 3 °F (36 3 °C) Abnormal  65 18 137/83 101 100 % None (Room air) Lying   05/28/23 0741 97 1 °F (36 2 °C) Abnormal  68 18 149/96 119 100 % None (Room air) Lying   05/27/23 2232 98 1 °F (36 7 °C) 70 18 148/84 -- 100 % None (Room air) Sitting   05/27/23 2225 -- -- -- -- -- 100 % None (Room air) --   05/27/23 1957 98 1 °F (36 7 °C) 84 16 140/80 -- 100 % None (Room air)          Clinical Opioid Withdrawal Scale (COWS): Clinical Opiate Withdrawal Scale    Row Name 05/29/23 0718 05/28/23 2211 05/28/23 1512 05/28/23 1144 05/28/23 0741   Clinical Opiate Withdrawal Scale   Pulse 55  -KO 56  -JW 68  -YR 65  -TB 68  -TB   Heart Rate Source -- -- -- Monitor  -TB Monitor  -TB   Patient Position - Orthostatic VS Lying  -KO Lying  -JW Sitting  -YR Lying  -TB Lying  -TB   Row Name 05/27/23 2232 05/27/23 1957      Clinical Opiate Withdrawal Scale   Pulse 70  -LL 84  -BC      Resting Pulse Rate: Measured After Patient is Sitting or Lying for One Minute 0  -LL --      GI Upset: Over Last Half Hour 0  -LL --      Sweating: Over Past Half Hour Not Accounted for by Room Temperature of Patient Activity 0  -LL --      Tremor: Observation of Outstretched Hands 0  -LL --      Restlessness: Observation During Assessment 0  -LL --      Yawning: Observation During Assessment 0  -LL --      Pupil Size 0  -LL --      Anxiety and Irritability 0  -LL --      Bone or Joint Aches: If Patient was Having Pain Previously, Only the Additional Component Attributed to Opiate Withdrawal is Scored 0  -LL --      Gooseflesh Skin 0  -LL --      Runny Nose or Tearing: Not Accounted for by Cold Symptoms or Allergies 0  -LL --      Clinical Opiate Withdrawal Scale Total Score 0  -LL --        Pertinent Labs/Diagnostic Test Results:   5/27 EKG: Normal sinus rhythm  Normal ECG  When compared with ECG of 11-FEB-2020 20:02,  No significant change was found      Results from last 7 days   Lab Units 05/28/23  0510 05/27/23 2035   HEMATOCRIT % 37 3 41 2   HEMOGLOBIN g/dL 13 0 14 1   NEUTROS ABS Thousands/µL  --  3 19   PLATELETS Thousands/uL 216 252   WBC Thousand/uL 5 25 5 66         Results from last 7 days   Lab Units 05/29/23  0508 05/28/23  0510 05/27/23 2035   ANION GAP mmol/L 7 6 7   BUN mg/dL 13 14 15   CALCIUM mg/dL 9 1 8 9 9 6   CHLORIDE mmol/L 106 105 101   CO2 mmol/L 27 29 31   CREATININE mg/dL 1 08 1 08 1 29   EGFR ml/min/1 73sq m 90 90 72   POTASSIUM mmol/L 3 8 3 7 4 0   MAGNESIUM mg/dL  --  2 2 1 7*   SODIUM mmol/L 140 140 139     Results from last 7 days   Lab Units 05/28/23  0510 05/27/23 2035   ALBUMIN g/dL 3 7 4 6   ALK PHOS U/L 41 49   ALT U/L 27 31   AST U/L 20 19   TOTAL BILIRUBIN mg/dL 0 27 0  55   TOTAL PROTEIN g/dL 6 0* 7 1         Results from last 7 days   Lab Units 05/29/23  0508 05/28/23  0510 05/27/23 2035   GLUCOSE RANDOM mg/dL 107 98 89       Results from last 7 days   Lab Units 05/27/23 2051   AMPH/METH  Positive*   BARBITURATE UR  Negative   BENZODIAZEPINE UR  Negative   COCAINE UR  Positive*   METHADONE URINE  Negative   OPIATE UR  Negative   PCP UR  Negative   THC UR  Positive*     Results from last 7 days   Lab Units 05/27/23 2035   ACETAMINOPHEN LVL ug/mL <10*   ETHANOL LVL mg/dL <62   SALICYLATE LVL mg/dL <5     ED Treatment:   Medication Administration from 05/27/2023 1950 to 05/27/2023 2230       Date/Time Order Dose Route Comments     05/27/2023 2039 EDT nicotine (NICODERM CQ) 7 mg/24hr TD 24 hr patch 7 mg 7 mg Transdermal --     05/27/2023 2036 EDT nicotine (NICODERM CQ) 21 mg/24 hr TD 24 hr patch 21 mg 21 mg Transdermal --        Past Medical History:   Diagnosis Date   • Hepatitis C      Present on Admission:  • Hepatitis C virus infection without hepatic coma      Admitting Diagnosis: Polysubstance abuse ([facility] [address] ) [F19 10]  Admitted to substance misuse detoxification center [Z78 9]  Age/Sex: 28 y o  male  Admission Orders:  REgular Diet  SCDs  COWS monitoring  Telemetry & Continuous Pulse Ox  Scheduled Medications:  buprenorphine-naloxone, 8 mg, Sublingual, BID  enoxaparin, 40 mg, Subcutaneous, Daily  multivitamin-minerals, 1 tablet, Oral, Daily  nicotine, 1 patch, Transdermal, Daily  transdermal buprenorphine, 20 mcg, Transdermal, Q7 Days      Continuous IV Infusions:     PRN Meds:  acetaminophen, 650 mg, Oral, Q6H PRN  clonazePAM, 1 mg, Oral, Q6H PRNx3 5/28  cloNIDine, 0 1 mg, Oral, Q6H PRN x2 5/28  ondansetron, 4 mg, Intravenous, Q6H PRN  traZODone, 50 mg, Oral, HS PRN      SUBOXONE x 3 5/28, subutex x 4 5/28       IP CONSULT TO CASE MANAGEMENT    Network Utilization Review Department  ATTENTION: Please call with any questions or concerns to [phone] and carefully listen to the prompts so that you are directed to the right person  All voicemails are confidential   Ag Rodriguez all requests for admission clinical reviews, approved or denied determinations and any other requests to dedicated fax number below belonging to the campus where the patient is receiving treatment   List of dedicated fax numbers for the Facilities:  [address] DENIALS (Administrative/Medical Necessity) [phone]   [address] (Maternity/NICU/Pediatrics) [phone]   7 Tanja Alfaro [phone]   [facility] [address] [phone]   [facility] [address] [phone]   [facility] 8[facility] [phone]

## 2023-05-30 VITALS
DIASTOLIC BLOOD PRESSURE: 56 MMHG | BODY MASS INDEX: 26.58 KG/M2 | SYSTOLIC BLOOD PRESSURE: 114 MMHG | OXYGEN SATURATION: 98 % | HEIGHT: 72 IN | WEIGHT: 196.21 LBS | HEART RATE: 60 BPM | RESPIRATION RATE: 18 BRPM | TEMPERATURE: 98 F

## 2023-05-30 PROBLEM — F11.93 OPIOID WITHDRAWAL (HCC): Status: RESOLVED | Noted: 2023-05-28 | Resolved: 2023-05-30

## 2023-05-30 PROCEDURE — 99239 HOSP IP/OBS DSCHRG MGMT >30: CPT

## 2023-05-30 RX ADMIN — CLONAZEPAM 1 MG: 0.5 TABLET ORAL at 08:17

## 2023-05-30 RX ADMIN — MULTIPLE VITAMINS W/ MINERALS TAB 1 TABLET: TAB ORAL at 08:12

## 2023-05-30 RX ADMIN — NICOTINE 1 PATCH: 21 PATCH, EXTENDED RELEASE TRANSDERMAL at 08:12

## 2023-05-30 RX ADMIN — BUPRENORPHINE 300 MG: 300 SOLUTION SUBCUTANEOUS at 08:17

## 2023-05-30 NOTE — CASE MANAGEMENT
Case Management Discharge Planning Note    Patient name Lisa Saenz  Location 5T DETOX 505/5T DETOX 48* MRN [id number]  : 1990 Date 2023       Current Admission Date: 2023  Current Admission Diagnosis:Hepatitis C virus infection without hepatic coma   Patient Active Problem List    Diagnosis Date Noted   • Tobacco use disorder 2023   • Polysubstance abuse ([facility] [address] ) 2023   • Opioid use disorder, severe, dependence ([facility] [address] ) 2023   • Hepatitis C virus infection without hepatic coma 2020   • 's permit PE (physical examination) 2020      LOS (days): 3  Geometric Mean LOS (GMLOS) (days):   Days to GMLOS:     OBJECTIVE:  Risk of Unplanned Readmission Score: 9 21         Current admission status: Inpatient   Preferred Pharmacy:   [id number]- [place] - [address] [place] [address]  Phone: [phone] Fax: 17 [facility] #[facility] [facility] [address] [facility] 67 [address] [facility] [address]  Phone: [phone] Fax: [phone]    Primary Care Provider: Nelly Martinez DO    Primary Insurance: [employer]Drop  Secondary Insurance:     DISCHARGE DETAILS: Cm consulted with medical staff and informed pt ready for discharge after receiving sublocade IM  Cm met with pt and pt stated he had contacted [facility] and was scheduled a f/u appointment on 2023  Pt stated he was not interested in [facility] therapy aftercare due to his work schedule  Pt stated he would walk home  Pt had no medications sent to pharmacy and will be discharged home today      Discharge planning discussed with[de-identified] patient  Freedom of Choice: Yes                   Contacts  Patient Contacts: [place] tx  Relationship to Patient[de-identified] Treatment Provider  Reason/Outcome: Continuity of Care, Discharge Planning              Other Referral/Resources/Interventions Provided:  Referrals Provided[de-identified] Therapist, Support Group, IOP    Would you like to participate in our [address] service program?  : No - Declined                                              Family notified[de-identified] No supportive DEV's signed

## 2023-05-30 NOTE — ASSESSMENT & PLAN NOTE
· Admits began using fentanyl/heroin 2018  · Mode of consumption includes insufflation as well as intravenously  · Admits over the last 3 weeks, has been consuming 2-3 bags of fentanyl with concurrent cocaine via insufflation  · Last use between 10-11 AM 05/27/2023  · Admits last intravenous use was 1 week ago  · Was recently seen at [facility] rehab 12/2022 and was given a prescription for Suboxone which patient misplaced  · PDMP showed patient filled Suboxone prescription on 05/02 for 28dys  · Patient amendable to being placed back on Suboxone, requesting Sublocade upon discharge  · Case management consulted for after-care planning  Patient will follow up with [facility]

## 2023-05-30 NOTE — ASSESSMENT & PLAN NOTE
· Admits last used fentanyl via insufflation between 10-11 AM on 05/27/2023  · Currently heather any withdrawal symptoms  · Patient tolerated micro-induction 5/28/23- transitioned to maintenance dosing  Received Sublocade Injection 300 mg upon discharge

## 2023-05-30 NOTE — CASE MANAGEMENT
Cm net with pt to discuss aftercare plans  Pt states he will return to [facility] treatment in [facility]  Pt states he had an appointment a=scheduled for this morning  Cm advised pt to contact [facility] to reschedule and provided pt with contact number  Pt stated he would contact this provider and inform cm of this rescheduled appointment

## 2023-05-31 NOTE — UTILIZATION REVIEW
NOTIFICATION OF ADMISSION DISCHARGE   This is a Notification of Discharge from [facility]  Please be advised that this patient has been discharge from our facility  Below you will find the admission and discharge date and time including the patient’s disposition  UTILIZATION REVIEW CONTACT:  Thea Nelson MA  Utilization   Network Utilization Review Department  Phone: [phone] x carefully listen to the prompts  All voicemails are confidential   Email: [email or web address]     ADMISSION INFORMATION  PRESENTATION DATE: 5/27/2023  7:53 PM  OBERVATION ADMISSION DATE:   INPATIENT ADMISSION DATE: 5/27/23  9:40 PM   DISCHARGE DATE: 5/30/2023  9:10 AM   DISPOSITION:Home/Self Care    IMPORTANT INFORMATION:  Send all requests for admission clinical reviews, approved or denied determinations and any other requests to dedicated fax number below belonging to the campus where the patient is receiving treatment   List of dedicated fax numbers:  [address] DENIALS (Administrative/Medical Necessity) [phone]   [address] (Maternity/NICU/Pediatrics) [phone]   [facility] [phone]   [place] [address] [phone]   [address] [phone]   220 [facility] [phone]   90 [facility] [phone]   [facility]8 [place] [address] [phone]   [facility]  [phone] 4058 [facility] [phone]   412 [facility] [address] [facility] [phone]

## 2023-06-02 ENCOUNTER — TRANSITIONAL CARE MANAGEMENT (OUTPATIENT)
Dept: FAMILY MEDICINE CLINIC | Facility: CLINIC | Age: 33
End: 2023-06-02